# Patient Record
Sex: MALE | Race: WHITE | NOT HISPANIC OR LATINO | Employment: FULL TIME | URBAN - METROPOLITAN AREA
[De-identification: names, ages, dates, MRNs, and addresses within clinical notes are randomized per-mention and may not be internally consistent; named-entity substitution may affect disease eponyms.]

---

## 2018-06-29 PROBLEM — R53.83 OTHER FATIGUE: Status: ACTIVE | Noted: 2018-06-29

## 2018-06-29 PROBLEM — M54.16 LUMBAR RADICULOPATHY, CHRONIC: Status: ACTIVE | Noted: 2018-06-29

## 2018-06-29 PROBLEM — M48.061 SPINAL STENOSIS OF LUMBAR REGION: Status: ACTIVE | Noted: 2018-06-29

## 2018-06-29 PROBLEM — M51.37 DDD (DEGENERATIVE DISC DISEASE), LUMBOSACRAL: Status: ACTIVE | Noted: 2018-06-29

## 2018-06-29 PROBLEM — M51.34 DDD (DEGENERATIVE DISC DISEASE), THORACIC: Status: ACTIVE | Noted: 2018-06-29

## 2018-09-28 PROBLEM — E66.9 OBESITY (BMI 30-39.9): Status: ACTIVE | Noted: 2018-09-28

## 2019-04-22 PROBLEM — G62.9 NEUROPATHY: Status: ACTIVE | Noted: 2019-04-22

## 2019-04-22 PROBLEM — R03.0 ELEVATED BP WITHOUT DIAGNOSIS OF HYPERTENSION: Status: ACTIVE | Noted: 2019-04-22

## 2020-07-13 PROBLEM — R26.9 ALTERED GAIT: Status: ACTIVE | Noted: 2020-07-13

## 2020-07-13 PROBLEM — Z76.89 ESTABLISHING CARE WITH NEW DOCTOR, ENCOUNTER FOR: Status: ACTIVE | Noted: 2020-07-13

## 2020-07-13 PROBLEM — M62.838 MUSCLE SPASMS OF BOTH LOWER EXTREMITIES: Status: ACTIVE | Noted: 2020-07-13

## 2021-10-04 PROBLEM — G89.4 CHRONIC PAIN SYNDROME: Status: ACTIVE | Noted: 2021-10-04

## 2021-10-04 PROBLEM — Z00.00 WELL ADULT EXAM: Status: ACTIVE | Noted: 2020-07-13

## 2021-10-16 PROBLEM — U07.1 INFECTION DUE TO 2019-NCOV: Status: ACTIVE | Noted: 2021-10-16

## 2021-11-15 PROBLEM — Z86.16 HISTORY OF COVID-19: Status: ACTIVE | Noted: 2021-11-15

## 2021-11-15 PROBLEM — R07.89 CHEST TIGHTNESS: Status: ACTIVE | Noted: 2021-11-15

## 2021-11-15 PROBLEM — R42 DIZZINESS: Status: ACTIVE | Noted: 2021-11-15

## 2021-12-03 ENCOUNTER — OFFICE VISIT (OUTPATIENT)
Dept: URGENT CARE | Facility: CLINIC | Age: 56
End: 2021-12-03
Payer: COMMERCIAL

## 2021-12-03 VITALS
RESPIRATION RATE: 16 BRPM | HEART RATE: 82 BPM | OXYGEN SATURATION: 98 % | BODY MASS INDEX: 32.05 KG/M2 | DIASTOLIC BLOOD PRESSURE: 70 MMHG | WEIGHT: 236.6 LBS | TEMPERATURE: 98.1 F | SYSTOLIC BLOOD PRESSURE: 122 MMHG | HEIGHT: 72 IN

## 2021-12-03 DIAGNOSIS — U09.9 POST-COVID SYNDROME: ICD-10-CM

## 2021-12-03 DIAGNOSIS — R07.9 CHEST PAIN, UNSPECIFIED TYPE: ICD-10-CM

## 2021-12-03 PROCEDURE — 99204 OFFICE O/P NEW MOD 45 MIN: CPT | Performed by: FAMILY MEDICINE

## 2021-12-03 PROCEDURE — 93000 ELECTROCARDIOGRAM COMPLETE: CPT | Performed by: FAMILY MEDICINE

## 2021-12-03 ASSESSMENT — FIBROSIS 4 INDEX: FIB4 SCORE: 1.11

## 2021-12-04 NOTE — PROGRESS NOTES
Subjective:      Chief Complaint   Patient presents with   • Chest Pain     Had covid back in october and has been experiencing this pain since             History is as follows:    Pt was diagnosed with COVID 12 November.    S/p Regen-Cov treatment on 14 November.  He reports that all of his respiratory sx have resolved.             He has been feeling intermittent chest pain and pressure since he had COVID.     He continues to have chest pain at least 3 times per day.   Pain is no worse with exertion.    Denies n/v.    Pain is episodic in nature, typically lasts for 3-4 minutes per episode.   Reports his resting HR has been elevated since jody COVID       Pertinent negatives include no claudication, cough, exertional chest pressure, fever, nausea, near-syncope, numbness, syncope or vomiting.    Denies chest wall tenderness.          Past Medical History:   Diagnosis Date   • Pain     states has chronic back pain         Social History     Tobacco Use   • Smoking status: Never Smoker   • Smokeless tobacco: Never Used   Vaping Use   • Vaping Use: Never used   Substance Use Topics   • Alcohol use: Yes     Comment: occasionally   • Drug use: No     Comment: CBD oil          Current Outpatient Medications on File Prior to Visit   Medication Sig Dispense Refill   • gabapentin (NEURONTIN) 800 MG tablet Take 1 Tablet by mouth 3 times a day. 180 Tablet 1   • cyclobenzaprine (FLEXERIL) 10 mg Tab Take 1 Tablet by mouth 3 times a day as needed for Muscle Spasms. 90 Tablet 0   • albuterol 108 (90 Base) MCG/ACT Aero Soln inhalation aerosol Inhale 2 Puffs every four hours as needed for Shortness of Breath. (Patient not taking: Reported on 12/3/2021) 1 Each 2   • methylPREDNISolone (MEDROL DOSEPAK) 4 MG Tablet Therapy Pack As directed on the packaging label. 21 Tablet 0     No current facility-administered medications on file prior to visit.       Social History     Socioeconomic History   • Marital status:       Spouse name: Not on file   • Number of children: Not on file   • Years of education: Not on file   • Highest education level: Associate degree: occupational, technical, or vocational program   Occupational History   • Not on file   Tobacco Use   • Smoking status: Never Smoker   • Smokeless tobacco: Never Used   Vaping Use   • Vaping Use: Never used   Substance and Sexual Activity   • Alcohol use: Yes     Comment: occasionally   • Drug use: No     Comment: CBD oil    • Sexual activity: Yes     Partners: Female   Other Topics Concern   • Not on file   Social History Narrative   • Not on file     Social Determinants of Health     Financial Resource Strain: Low Risk    • Difficulty of Paying Living Expenses: Not very hard   Food Insecurity: No Food Insecurity   • Worried About Running Out of Food in the Last Year: Never true   • Ran Out of Food in the Last Year: Never true   Transportation Needs: No Transportation Needs   • Lack of Transportation (Medical): No   • Lack of Transportation (Non-Medical): No   Physical Activity: Insufficiently Active   • Days of Exercise per Week: 2 days   • Minutes of Exercise per Session: 20 min   Stress: Stress Concern Present   • Feeling of Stress : To some extent   Social Connections: Moderately Integrated   • Frequency of Communication with Friends and Family: More than three times a week   • Frequency of Social Gatherings with Friends and Family: Once a week   • Attends Baptism Services: 1 to 4 times per year   • Active Member of Clubs or Organizations: No   • Attends Club or Organization Meetings: Never   • Marital Status:    Intimate Partner Violence:    • Fear of Current or Ex-Partner: Not on file   • Emotionally Abused: Not on file   • Physically Abused: Not on file   • Sexually Abused: Not on file   Housing Stability: Low Risk    • Unable to Pay for Housing in the Last Year: No   • Number of Places Lived in the Last Year: 1   • Unstable Housing in the Last Year: No           Review of Systems   Constitutional: Negative for fever, chills and malaise/fatigue.   Eyes: Negative for vision changes, d/c.    Respiratory: Negative for cough and sputum production.    Cardiovascular: + for chest pain and palpitations.   Gastrointestinal: Negative for nausea, vomiting, abdominal pain, diarrhea and constipation.   Genitourinary: Negative for dysuria, urgency and frequency.   Skin: Negative for rash or  itching.   Neurological: Negative for dizziness and tingling.   Psychiatric/Behavioral: Negative for depression.   Hematologic/lymphatic - denies bruising or excessive bleeding  All other systems reviewed and are negative.         Objective:     /70 (BP Location: Right arm, Patient Position: Sitting, BP Cuff Size: Adult long)   Pulse 82   Temp 36.7 °C (98.1 °F) (Temporal)   Resp 16   Ht 1.829 m (6')   Wt 107 kg (236 lb 9.6 oz)   SpO2 98%       Physical Exam   Constitutional: pt is oriented to person, place, and time. Pt appears well-developed and well-nourished.   HENT:   Head: Normocephalic and atraumatic.   Mouth/Throat: Oropharynx is clear and moist.   Eyes: Conjunctivae and EOM are normal. Pupils are equal, round, and reactive to light. No scleral icterus.   Neck: Normal range of motion. Neck supple. No JVD present. No thyromegaly present.   Cardiovascular: Normal rate, regular rhythm, normal heart sounds and intact distal pulses.  Exam reveals no gallop and no friction rub.    No murmur heard.  Pulmonary/Chest: Effort normal and breath sounds normal. No respiratory distress. Pt has no wheezes. Pt has no rales. Pt exhibits no tenderness.   Abdominal: Soft.   Musculoskeletal: Normal range of motion. Pt exhibits no edema.   Lymphadenopathy:     Pt has no cervical adenopathy.   Neurological: pt is alert and oriented to person, place, and time. No cranial nerve deficit.   Skin: Skin is warm and dry. No erythema.   Psychiatric: pt has a normal mood and affect. patient's behavior is  normal.        EKG interpretation - normal sinus rhythm. No ST or QRS morphology changes. QT interval is normal. Axis is positive. No signs of ischemia.    Assessment/Plan:         1. Chest pain, unspecified type    Pt with intermittent chest pain since COVID diagnosis.  EKG was sinus rhythm with no abnormalities.  ACS felt unlikely at this time.      Pulmonary embolus also on the differential, although EKG, oxygen saturation were both normal.  Despite ACS being remote possibility, I still recommend urgent evaluation in ED to rule out - pt refused and understands potential risks.         Will refer to cardiology for risk stratification.        Labs ordered:     - REFERRAL TO CARDIOLOGY  - D-DIMER; Future  - CBC WITH DIFFERENTIAL; Future  - Comp Metabolic Panel; Future  - TSH           - REFERRAL TO CARDIOLOGY

## 2021-12-05 ENCOUNTER — TELEPHONE (OUTPATIENT)
Dept: URGENT CARE | Facility: PHYSICIAN GROUP | Age: 56
End: 2021-12-05

## 2021-12-05 NOTE — TELEPHONE ENCOUNTER
Please call pt:      Was seen Friday at [facility] for chest pain.       referred to cardiology - will be contacted on Monday.     Also, please remind pt that labs were ordered and he can go to any [facility] lab site to get them done.

## 2021-12-06 ENCOUNTER — HOSPITAL ENCOUNTER (OUTPATIENT)
Dept: LAB | Facility: MEDICAL CENTER | Age: 56
End: 2021-12-06
Attending: FAMILY MEDICINE
Payer: COMMERCIAL

## 2021-12-06 DIAGNOSIS — R07.9 CHEST PAIN, UNSPECIFIED TYPE: ICD-10-CM

## 2021-12-06 LAB
ALBUMIN SERPL BCP-MCNC: 4.2 G/DL (ref 3.2–4.9)
ALBUMIN/GLOB SERPL: 1.6 G/DL
ALP SERPL-CCNC: 82 U/L (ref 30–99)
ALT SERPL-CCNC: 11 U/L (ref 2–50)
ANION GAP SERPL CALC-SCNC: 10 MMOL/L (ref 7–16)
AST SERPL-CCNC: 13 U/L (ref 12–45)
BASOPHILS # BLD AUTO: 0.6 % (ref 0–1.8)
BASOPHILS # BLD: 0.03 K/UL (ref 0–0.12)
BILIRUB SERPL-MCNC: 0.4 MG/DL (ref 0.1–1.5)
BUN SERPL-MCNC: 18 MG/DL (ref 8–22)
CALCIUM SERPL-MCNC: 8.9 MG/DL (ref 8.4–10.2)
CHLORIDE SERPL-SCNC: 109 MMOL/L (ref 96–112)
CO2 SERPL-SCNC: 23 MMOL/L (ref 20–33)
CREAT SERPL-MCNC: 1.14 MG/DL (ref 0.5–1.4)
D DIMER PPP IA.FEU-MCNC: 3.44 UG/ML (FEU) (ref 0–0.5)
EOSINOPHIL # BLD AUTO: 0.08 K/UL (ref 0–0.51)
EOSINOPHIL NFR BLD: 1.6 % (ref 0–6.9)
ERYTHROCYTE [DISTWIDTH] IN BLOOD BY AUTOMATED COUNT: 43.1 FL (ref 35.9–50)
GLOBULIN SER CALC-MCNC: 2.7 G/DL (ref 1.9–3.5)
GLUCOSE SERPL-MCNC: 105 MG/DL (ref 65–99)
HCT VFR BLD AUTO: 40.4 % (ref 42–52)
HGB BLD-MCNC: 13.3 G/DL (ref 14–18)
IMM GRANULOCYTES # BLD AUTO: 0.02 K/UL (ref 0–0.11)
IMM GRANULOCYTES NFR BLD AUTO: 0.4 % (ref 0–0.9)
LYMPHOCYTES # BLD AUTO: 1.4 K/UL (ref 1–4.8)
LYMPHOCYTES NFR BLD: 28.2 % (ref 22–41)
MCH RBC QN AUTO: 30.5 PG (ref 27–33)
MCHC RBC AUTO-ENTMCNC: 32.9 G/DL (ref 33.7–35.3)
MCV RBC AUTO: 92.7 FL (ref 81.4–97.8)
MONOCYTES # BLD AUTO: 0.35 K/UL (ref 0–0.85)
MONOCYTES NFR BLD AUTO: 7 % (ref 0–13.4)
NEUTROPHILS # BLD AUTO: 3.09 K/UL (ref 1.82–7.42)
NEUTROPHILS NFR BLD: 62.2 % (ref 44–72)
NRBC # BLD AUTO: 0 K/UL
NRBC BLD-RTO: 0 /100 WBC
PLATELET # BLD AUTO: 196 K/UL (ref 164–446)
PMV BLD AUTO: 10 FL (ref 9–12.9)
POTASSIUM SERPL-SCNC: 4.5 MMOL/L (ref 3.6–5.5)
PROT SERPL-MCNC: 6.9 G/DL (ref 6–8.2)
RBC # BLD AUTO: 4.36 M/UL (ref 4.7–6.1)
SODIUM SERPL-SCNC: 142 MMOL/L (ref 135–145)
TSH SERPL DL<=0.005 MIU/L-ACNC: 0.85 UIU/ML (ref 0.38–5.33)
WBC # BLD AUTO: 5 K/UL (ref 4.8–10.8)

## 2021-12-06 PROCEDURE — 80053 COMPREHEN METABOLIC PANEL: CPT

## 2021-12-06 PROCEDURE — 84443 ASSAY THYROID STIM HORMONE: CPT

## 2021-12-06 PROCEDURE — 36415 COLL VENOUS BLD VENIPUNCTURE: CPT

## 2021-12-06 PROCEDURE — 85379 FIBRIN DEGRADATION QUANT: CPT

## 2021-12-06 PROCEDURE — 85025 COMPLETE CBC W/AUTO DIFF WBC: CPT

## 2021-12-07 ENCOUNTER — TELEPHONE (OUTPATIENT)
Dept: URGENT CARE | Facility: CLINIC | Age: 56
End: 2021-12-07

## 2021-12-07 NOTE — TELEPHONE ENCOUNTER
Pt called and would like return call from provider to go over lab results. Number in chart was verified as a good contact number.

## 2021-12-08 ENCOUNTER — HOSPITAL ENCOUNTER (OUTPATIENT)
Facility: MEDICAL CENTER | Age: 56
End: 2021-12-09
Attending: EMERGENCY MEDICINE | Admitting: FAMILY MEDICINE
Payer: COMMERCIAL

## 2021-12-08 ENCOUNTER — APPOINTMENT (OUTPATIENT)
Dept: CARDIOLOGY | Facility: PHYSICIAN GROUP | Age: 56
End: 2021-12-08
Payer: COMMERCIAL

## 2021-12-08 ENCOUNTER — APPOINTMENT (OUTPATIENT)
Dept: RADIOLOGY | Facility: MEDICAL CENTER | Age: 56
End: 2021-12-08
Attending: EMERGENCY MEDICINE
Payer: COMMERCIAL

## 2021-12-08 ENCOUNTER — HOSPITAL ENCOUNTER (OUTPATIENT)
Dept: RADIOLOGY | Facility: MEDICAL CENTER | Age: 56
End: 2021-12-08
Attending: FAMILY MEDICINE
Payer: COMMERCIAL

## 2021-12-08 ENCOUNTER — TELEPHONE (OUTPATIENT)
Dept: URGENT CARE | Facility: PHYSICIAN GROUP | Age: 56
End: 2021-12-08

## 2021-12-08 DIAGNOSIS — R79.89 ELEVATED D-DIMER: ICD-10-CM

## 2021-12-08 DIAGNOSIS — I26.99 ACUTE PULMONARY EMBOLISM, UNSPECIFIED PULMONARY EMBOLISM TYPE, UNSPECIFIED WHETHER ACUTE COR PULMONALE PRESENT (HCC): ICD-10-CM

## 2021-12-08 PROBLEM — R07.9 PAIN IN THE CHEST: Status: ACTIVE | Noted: 2021-12-08

## 2021-12-08 LAB
ALBUMIN SERPL BCP-MCNC: 4.4 G/DL (ref 3.2–4.9)
ALBUMIN/GLOB SERPL: 1.6 G/DL
ALP SERPL-CCNC: 83 U/L (ref 30–99)
ALT SERPL-CCNC: 11 U/L (ref 2–50)
ANION GAP SERPL CALC-SCNC: 9 MMOL/L (ref 7–16)
AST SERPL-CCNC: 13 U/L (ref 12–45)
BASOPHILS # BLD AUTO: 0.6 % (ref 0–1.8)
BASOPHILS # BLD: 0.03 K/UL (ref 0–0.12)
BILIRUB SERPL-MCNC: 0.3 MG/DL (ref 0.1–1.5)
BUN SERPL-MCNC: 16 MG/DL (ref 8–22)
CALCIUM SERPL-MCNC: 9.2 MG/DL (ref 8.5–10.5)
CHLORIDE SERPL-SCNC: 105 MMOL/L (ref 96–112)
CHOLEST SERPL-MCNC: 245 MG/DL (ref 100–199)
CO2 SERPL-SCNC: 25 MMOL/L (ref 20–33)
CREAT SERPL-MCNC: 0.97 MG/DL (ref 0.5–1.4)
EKG IMPRESSION: NORMAL
EOSINOPHIL # BLD AUTO: 0.13 K/UL (ref 0–0.51)
EOSINOPHIL NFR BLD: 2.5 % (ref 0–6.9)
ERYTHROCYTE [DISTWIDTH] IN BLOOD BY AUTOMATED COUNT: 43.2 FL (ref 35.9–50)
EST. AVERAGE GLUCOSE BLD GHB EST-MCNC: 111 MG/DL
GLOBULIN SER CALC-MCNC: 2.8 G/DL (ref 1.9–3.5)
GLUCOSE SERPL-MCNC: 90 MG/DL (ref 65–99)
HBA1C MFR BLD: 5.5 % (ref 4–5.6)
HCT VFR BLD AUTO: 40.7 % (ref 42–52)
HDLC SERPL-MCNC: 46 MG/DL
HGB BLD-MCNC: 14.1 G/DL (ref 14–18)
IMM GRANULOCYTES # BLD AUTO: 0.02 K/UL (ref 0–0.11)
IMM GRANULOCYTES NFR BLD AUTO: 0.4 % (ref 0–0.9)
LDLC SERPL CALC-MCNC: 182 MG/DL
LYMPHOCYTES # BLD AUTO: 1.64 K/UL (ref 1–4.8)
LYMPHOCYTES NFR BLD: 31.1 % (ref 22–41)
MCH RBC QN AUTO: 31.6 PG (ref 27–33)
MCHC RBC AUTO-ENTMCNC: 34.6 G/DL (ref 33.7–35.3)
MCV RBC AUTO: 91.3 FL (ref 81.4–97.8)
MONOCYTES # BLD AUTO: 0.31 K/UL (ref 0–0.85)
MONOCYTES NFR BLD AUTO: 5.9 % (ref 0–13.4)
NEUTROPHILS # BLD AUTO: 3.14 K/UL (ref 1.82–7.42)
NEUTROPHILS NFR BLD: 59.5 % (ref 44–72)
NRBC # BLD AUTO: 0 K/UL
NRBC BLD-RTO: 0 /100 WBC
PLATELET # BLD AUTO: 210 K/UL (ref 164–446)
PMV BLD AUTO: 10.2 FL (ref 9–12.9)
POTASSIUM SERPL-SCNC: 4.1 MMOL/L (ref 3.6–5.5)
PROT SERPL-MCNC: 7.2 G/DL (ref 6–8.2)
RBC # BLD AUTO: 4.46 M/UL (ref 4.7–6.1)
SODIUM SERPL-SCNC: 139 MMOL/L (ref 135–145)
TRIGL SERPL-MCNC: 87 MG/DL (ref 0–149)
TROPONIN T SERPL-MCNC: 10 NG/L (ref 6–19)
TROPONIN T SERPL-MCNC: <6 NG/L (ref 6–19)
WBC # BLD AUTO: 5.3 K/UL (ref 4.8–10.8)

## 2021-12-08 PROCEDURE — 80061 LIPID PANEL: CPT

## 2021-12-08 PROCEDURE — G0378 HOSPITAL OBSERVATION PER HR: HCPCS

## 2021-12-08 PROCEDURE — 71275 CT ANGIOGRAPHY CHEST: CPT

## 2021-12-08 PROCEDURE — 84484 ASSAY OF TROPONIN QUANT: CPT

## 2021-12-08 PROCEDURE — 85025 COMPLETE CBC W/AUTO DIFF WBC: CPT

## 2021-12-08 PROCEDURE — 93005 ELECTROCARDIOGRAM TRACING: CPT

## 2021-12-08 PROCEDURE — 80053 COMPREHEN METABOLIC PANEL: CPT

## 2021-12-08 PROCEDURE — 93005 ELECTROCARDIOGRAM TRACING: CPT | Performed by: EMERGENCY MEDICINE

## 2021-12-08 PROCEDURE — 83036 HEMOGLOBIN GLYCOSYLATED A1C: CPT

## 2021-12-08 PROCEDURE — 700117 HCHG RX CONTRAST REV CODE 255: Performed by: FAMILY MEDICINE

## 2021-12-08 PROCEDURE — 99285 EMERGENCY DEPT VISIT HI MDM: CPT

## 2021-12-08 PROCEDURE — 71045 X-RAY EXAM CHEST 1 VIEW: CPT

## 2021-12-08 PROCEDURE — 99218 PR INITIAL OBSERVATION CARE,LEVL I: CPT | Mod: GC | Performed by: FAMILY MEDICINE

## 2021-12-08 RX ORDER — CLONIDINE HYDROCHLORIDE 0.1 MG/1
0.1 TABLET ORAL EVERY 6 HOURS PRN
Status: DISCONTINUED | OUTPATIENT
Start: 2021-12-08 | End: 2021-12-09 | Stop reason: HOSPADM

## 2021-12-08 RX ORDER — AMOXICILLIN 250 MG
2 CAPSULE ORAL 2 TIMES DAILY
Status: DISCONTINUED | OUTPATIENT
Start: 2021-12-09 | End: 2021-12-09 | Stop reason: HOSPADM

## 2021-12-08 RX ORDER — ACETAMINOPHEN 325 MG/1
650 TABLET ORAL EVERY 6 HOURS PRN
Status: DISCONTINUED | OUTPATIENT
Start: 2021-12-08 | End: 2021-12-09 | Stop reason: HOSPADM

## 2021-12-08 RX ORDER — LABETALOL HYDROCHLORIDE 5 MG/ML
10 INJECTION, SOLUTION INTRAVENOUS EVERY 4 HOURS PRN
Status: DISCONTINUED | OUTPATIENT
Start: 2021-12-08 | End: 2021-12-09 | Stop reason: HOSPADM

## 2021-12-08 RX ORDER — ENALAPRILAT 1.25 MG/ML
1.25 INJECTION INTRAVENOUS EVERY 6 HOURS PRN
Status: DISCONTINUED | OUTPATIENT
Start: 2021-12-08 | End: 2021-12-09 | Stop reason: HOSPADM

## 2021-12-08 RX ORDER — BISACODYL 10 MG
10 SUPPOSITORY, RECTAL RECTAL
Status: DISCONTINUED | OUTPATIENT
Start: 2021-12-08 | End: 2021-12-09 | Stop reason: HOSPADM

## 2021-12-08 RX ORDER — GABAPENTIN 400 MG/1
800 CAPSULE ORAL 3 TIMES DAILY
Status: DISCONTINUED | OUTPATIENT
Start: 2021-12-08 | End: 2021-12-09 | Stop reason: HOSPADM

## 2021-12-08 RX ORDER — POLYETHYLENE GLYCOL 3350 17 G/17G
1 POWDER, FOR SOLUTION ORAL
Status: DISCONTINUED | OUTPATIENT
Start: 2021-12-08 | End: 2021-12-09 | Stop reason: HOSPADM

## 2021-12-08 RX ADMIN — IOHEXOL 100 ML: 350 INJECTION, SOLUTION INTRAVENOUS at 12:00

## 2021-12-08 ASSESSMENT — PATIENT HEALTH QUESTIONNAIRE - PHQ9
SUM OF ALL RESPONSES TO PHQ9 QUESTIONS 1 AND 2: 0
1. LITTLE INTEREST OR PLEASURE IN DOING THINGS: NOT AT ALL
2. FEELING DOWN, DEPRESSED, IRRITABLE, OR HOPELESS: NOT AT ALL

## 2021-12-08 ASSESSMENT — PAIN SCALES - WONG BAKER: WONGBAKER_NUMERICALRESPONSE: DOESN'T HURT AT ALL

## 2021-12-08 ASSESSMENT — FIBROSIS 4 INDEX
FIB4 SCORE: 1.119899279860264884
FIB4 SCORE: 1.05

## 2021-12-08 ASSESSMENT — PAIN DESCRIPTION - PAIN TYPE: TYPE: ACUTE PAIN

## 2021-12-08 NOTE — ED TRIAGE NOTES
Chief Complaint   Patient presents with   • Shortness of Breath     CTA positive for Right middle and lower lobe PE's. Symptoms started 4 weeks ago and patient was seen at  last friday and CT was ordered. Blood work and CT done today.   • Chest Pain     Pt ambulatory to triage for above complaint.  Pt is AO x 4, follows commands, and responds appropriately to questions. Patient's breathing is unlabored and pain is currently 8/10 on the 0-10 pain scale.  Pt placed in EKG room. Patient educated on triage process and encouraged to alert staff for any changes.

## 2021-12-08 NOTE — TELEPHONE ENCOUNTER
CT scan shows bilat PE    Pt continues to have chest pain, sob      Plan:      Pt will require higher level of care    Will tx to [facility] ED    Pt refused ambulance transfer - advised of risks      Report called to transfer line

## 2021-12-08 NOTE — TELEPHONE ENCOUNTER
Positive d-dimer      Plan:    Will schedule CTA for today.       Advised to go to ED in meantime is chest pain worsens.       He also has appt with cardiology today

## 2021-12-08 NOTE — PROGRESS NOTES
Subjective:   Chief Complaint: No chief complaint on file.      Yang Sánchez is a 56 y.o. male who is is referred by STEPHANIE Levy for chest pain, lightheadedness, fatigue, prior Covid infection.    DATA REVIEWED by me:  ECG (my personal interpretation) December 3, 2021  Sinus, 65, normal    Echo    Most recent labs:       Lab Results   Component Value Date/Time    WBC 5.0 12/06/2021 11:01 AM    HEMOGLOBIN 13.3 (L) 12/06/2021 11:01 AM    HEMATOCRIT 40.4 (L) 12/06/2021 11:01 AM    MCV 92.7 12/06/2021 11:01 AM    TSH 0.525 11/01/2021 09:35 AM      Lab Results   Component Value Date/Time    SODIUM 142 12/06/2021 11:01 AM    POTASSIUM 4.5 12/06/2021 11:01 AM    CHLORIDE 109 12/06/2021 11:01 AM    CO2 23 12/06/2021 11:01 AM    GLUCOSE 105 (H) 12/06/2021 11:01 AM    BUN 18 12/06/2021 11:01 AM    CREATININE 1.14 12/06/2021 11:01 AM      Lab Results   Component Value Date/Time    ASTSGOT 13 12/06/2021 11:01 AM    ALTSGPT 11 12/06/2021 11:01 AM    ALBUMIN 4.2 12/06/2021 11:01 AM      Lab Results   Component Value Date/Time    CHOLSTRLTOT 211 (H) 11/01/2021 09:35 AM    CHOLSTRLTOT 199 07/20/2020 07:40 AM     (H) 07/20/2020 07:40 AM    HDL 41 11/01/2021 09:35 AM    HDL 49.0 07/20/2020 07:40 AM    TRIGLYCERIDE 91 11/01/2021 09:35 AM    TRIGLYCERIDE 54 07/20/2020 07:40 AM     No results for input(s): NTPROBNP, TROPONINT in the last 72 hours.      Past Medical History:   Diagnosis Date   • Pain     states has chronic back pain     Past Surgical History:   Procedure Laterality Date   • UMBILICAL HERNIA REPAIR N/A 9/6/2016    Procedure: UMBILICAL HERNIA REPAIR;  Surgeon: Alphonso Cantu M.D.;  Location: SURGERY [facility];  Service:    • HERNIA REPAIR     • OTHER ABDOMINAL SURGERY      umbilical hernia repair, appy, vasectomy    • VASECTOMY       No family history on file.  Social History     Socioeconomic History   • Marital status:      Spouse name: Not on file   • Number of children: Not on file   •  Years of education: Not on file   • Highest education level: Associate degree: occupational, technical, or vocational program   Occupational History   • Not on file   Tobacco Use   • Smoking status: Never Smoker   • Smokeless tobacco: Never Used   Vaping Use   • Vaping Use: Never used   Substance and Sexual Activity   • Alcohol use: Yes     Comment: occasionally   • Drug use: No     Comment: CBD oil    • Sexual activity: Yes     Partners: Female   Other Topics Concern   • Not on file   Social History Narrative   • Not on file     Social Determinants of Health     Financial Resource Strain: Low Risk    • Difficulty of Paying Living Expenses: Not very hard   Food Insecurity: No Food Insecurity   • Worried About Running Out of Food in the Last Year: Never true   • Ran Out of Food in the Last Year: Never true   Transportation Needs: No Transportation Needs   • Lack of Transportation (Medical): No   • Lack of Transportation (Non-Medical): No   Physical Activity: Insufficiently Active   • Days of Exercise per Week: 2 days   • Minutes of Exercise per Session: 20 min   Stress: Stress Concern Present   • Feeling of Stress : To some extent   Social Connections: Moderately Integrated   • Frequency of Communication with Friends and Family: More than three times a week   • Frequency of Social Gatherings with Friends and Family: Once a week   • Attends Sabianist Services: 1 to 4 times per year   • Active Member of Clubs or Organizations: No   • Attends Club or Organization Meetings: Never   • Marital Status:    Intimate Partner Violence:    • Fear of Current or Ex-Partner: Not on file   • Emotionally Abused: Not on file   • Physically Abused: Not on file   • Sexually Abused: Not on file   Housing Stability: Low Risk    • Unable to Pay for Housing in the Last Year: No   • Number of Places Lived in the Last Year: 1   • Unstable Housing in the Last Year: No     No Known Allergies    Current Outpatient Medications   Medication  Sig Dispense Refill   • methylPREDNISolone (MEDROL DOSEPAK) 4 MG Tablet Therapy Pack As directed on the packaging label. 21 Tablet 0   • gabapentin (NEURONTIN) 800 MG tablet Take 1 Tablet by mouth 3 times a day. 180 Tablet 1   • cyclobenzaprine (FLEXERIL) 10 mg Tab Take 1 Tablet by mouth 3 times a day as needed for Muscle Spasms. 90 Tablet 0     No current facility-administered medications for this visit.       ROS  All others systems reviewed and negative.     Objective:     There were no vitals taken for this visit. There is no height or weight on file to calculate BMI.    General: No acute distress. Well nourished.  HEENT: EOM grossly intact, no scleral icterus, no pharyngeal erythema.   Neck:  No JVD, no bruits, trachea midline  CVS: RRR. Normal S1, S2. No M/R/G. No LE edema.  2+ radial pulses, 2+ PT pulses  Resp: CTAB. No wheezing or crackles/rhonchi. Normal respiratory effort.  Abdomen: Soft, NT, no sumaya hepatomegaly.  MSK/Ext: No clubbing or cyanosis.  Skin: Warm and dry, no rashes.  Neurological: CN III-XII grossly intact. No focal deficits.   Psych: A&O x 3, appropriate affect, good judgement        Assessment:     No diagnosis found.    Medical Decision Making:  Today's Assessment / Status / Plan:               Written instructions given today:        No follow-ups on file.    It is my pleasure to participate in the care of Mr. Sánchez.  Please do not hesitate to contact me with questions or concerns.    Annie Lopez MD, [facility]  Cardiologist [facility]    Please note that this dictation was created using voice recognition software. I have made every reasonable attempt to correct obvious errors, but it is possible there are errors of grammar and possibly content that I did not discover before finalizing the note.

## 2021-12-09 ENCOUNTER — APPOINTMENT (OUTPATIENT)
Dept: CARDIOLOGY | Facility: MEDICAL CENTER | Age: 56
End: 2021-12-09
Attending: STUDENT IN AN ORGANIZED HEALTH CARE EDUCATION/TRAINING PROGRAM
Payer: COMMERCIAL

## 2021-12-09 VITALS
HEIGHT: 72 IN | BODY MASS INDEX: 32.13 KG/M2 | HEART RATE: 83 BPM | RESPIRATION RATE: 16 BRPM | WEIGHT: 237.22 LBS | OXYGEN SATURATION: 93 % | DIASTOLIC BLOOD PRESSURE: 75 MMHG | SYSTOLIC BLOOD PRESSURE: 113 MMHG | TEMPERATURE: 98.1 F

## 2021-12-09 PROBLEM — R07.9 PAIN IN THE CHEST: Status: RESOLVED | Noted: 2021-12-08 | Resolved: 2021-12-09

## 2021-12-09 LAB
LV EJECT FRACT  99904: 60
LV EJECT FRACT MOD 2C 99903: 47.47
LV EJECT FRACT MOD 4C 99902: 60.39
LV EJECT FRACT MOD BP 99901: 47.91

## 2021-12-09 PROCEDURE — 700102 HCHG RX REV CODE 250 W/ 637 OVERRIDE(OP): Performed by: STUDENT IN AN ORGANIZED HEALTH CARE EDUCATION/TRAINING PROGRAM

## 2021-12-09 PROCEDURE — 93306 TTE W/DOPPLER COMPLETE: CPT | Mod: 26 | Performed by: INTERNAL MEDICINE

## 2021-12-09 PROCEDURE — G0378 HOSPITAL OBSERVATION PER HR: HCPCS

## 2021-12-09 PROCEDURE — 93306 TTE W/DOPPLER COMPLETE: CPT

## 2021-12-09 PROCEDURE — A9270 NON-COVERED ITEM OR SERVICE: HCPCS | Performed by: STUDENT IN AN ORGANIZED HEALTH CARE EDUCATION/TRAINING PROGRAM

## 2021-12-09 PROCEDURE — 99217 PR OBSERVATION CARE DISCHARGE: CPT | Performed by: INTERNAL MEDICINE

## 2021-12-09 RX ADMIN — APIXABAN 10 MG: 5 TABLET, FILM COATED ORAL at 05:31

## 2021-12-09 RX ADMIN — APIXABAN 10 MG: 5 TABLET, FILM COATED ORAL at 14:25

## 2021-12-09 RX ADMIN — GABAPENTIN 800 MG: 400 CAPSULE ORAL at 06:29

## 2021-12-09 RX ADMIN — APIXABAN 10 MG: 5 TABLET, FILM COATED ORAL at 00:00

## 2021-12-09 ASSESSMENT — LIFESTYLE VARIABLES
TOTAL SCORE: 0
EVER HAD A DRINK FIRST THING IN THE MORNING TO STEADY YOUR NERVES TO GET RID OF A HANGOVER: NO
HAVE PEOPLE ANNOYED YOU BY CRITICIZING YOUR DRINKING: NO
HOW MANY TIMES IN THE PAST YEAR HAVE YOU HAD 5 OR MORE DRINKS IN A DAY: 0
CONSUMPTION TOTAL: NEGATIVE
EVER FELT BAD OR GUILTY ABOUT YOUR DRINKING: NO
HAVE YOU EVER FELT YOU SHOULD CUT DOWN ON YOUR DRINKING: NO
DOES PATIENT WANT TO STOP DRINKING: NO
ON A TYPICAL DAY WHEN YOU DRINK ALCOHOL HOW MANY DRINKS DO YOU HAVE: 0
AVERAGE NUMBER OF DAYS PER WEEK YOU HAVE A DRINK CONTAINING ALCOHOL: 0
ALCOHOL_USE: NO
TOTAL SCORE: 0
TOTAL SCORE: 0

## 2021-12-09 ASSESSMENT — PATIENT HEALTH QUESTIONNAIRE - PHQ9
1. LITTLE INTEREST OR PLEASURE IN DOING THINGS: NOT AT ALL
2. FEELING DOWN, DEPRESSED, IRRITABLE, OR HOPELESS: NOT AT ALL
SUM OF ALL RESPONSES TO PHQ9 QUESTIONS 1 AND 2: 0

## 2021-12-09 ASSESSMENT — PAIN DESCRIPTION - PAIN TYPE: TYPE: ACUTE PAIN

## 2021-12-09 ASSESSMENT — PAIN SCALES - WONG BAKER: WONGBAKER_NUMERICALRESPONSE: DOESN'T HURT AT ALL

## 2021-12-09 NOTE — H&P
"[facility] MEDICINE HISTORY AND PHYSICAL     PATIENT ID:  NAME:  Yang Sánchez  MRN:               [id number]  YOB: 1965    Date of Admission: 12/8/2021     Attending: Dr. Rodriguez    Resident: Dr. Mary Yee, PGY 3    Primary Care Physician:  Pcp: PERCY Das    CC:  Sent in for findings of PE on CTA chest    HPI: Yang Sánchez is a 56 y.o. male PMH lumbar spinal stenosis on gabapentin, COVID-19, symptom onset Oct 11 2021, positive test Oct 12 (1x pfizer vaccine about 1 week prior to onset of sxs) who presented with the above CC.  The patient states that he was quite ill from Covid for 2 weeks starting on the above date.  He and his wife report that he had \"all of the symptoms, except for the respiratory stuff.\"  About 4 weeks ago, the patient began having shortness of breath.  He also reported having intermittent chest tightness in the substernal area, and sometimes pain.  He saw his primary care physician for this, and was referred to cardiology.  They state that the next appointment was approximately 7 weeks out, and so when the pain became particularly intense 5 days ago, who presented to urgent care.    The urgent care physician ordered a CT PE, as well as labs. The CT PE was performed today, and showed right middle and upper lobe emboli, as well as elevated right ventricular to left ventricular ratio of 1.5.  The patient was called and advised to present to the emergency department.    The patient still reports shortness of breath, though his ER course shows that his oxygen saturation is generally in the mid 90s (one value of 89%); he is not tachycardic, and he is nontoxic, speaking easily in full sentences with normal WOB.  His blood pressure is 130s to 140s systolic.  His CBC and CMP are within normal limits essentially, and his troponin T is undetectable.  His EKG shows sinus rhythm with a QTC of 402, and no ST changes.  His D-dimer is 3.44, and his chest x-ray shows no acute " changes.    REVIEW OF SYSTEMS:   Ten systems reviewed and were negative except as noted in the HPI.                PAST MEDICAL HISTORY:  Past Medical History:   Diagnosis Date   • Pain     states has chronic back pain       PAST SURGICAL HISTORY:  Past Surgical History:   Procedure Laterality Date   • UMBILICAL HERNIA REPAIR N/A 9/6/2016    Procedure: UMBILICAL HERNIA REPAIR;  Surgeon: Alphonso Cantu M.D.;  Location: SURGERY [facility];  Service:    • HERNIA REPAIR     • OTHER ABDOMINAL SURGERY      umbilical hernia repair, appy, vasectomy    • VASECTOMY         FAMILY HISTORY:  No family history on file.    SOCIAL HISTORY:   Pt lives in [place], [place]  Smoking denies  Etoh use very rare  Drug use denies    DIET:   Orders Placed This Encounter   Procedures   • Diet Order Diet: Regular     Standing Status:   Standing     Number of Occurrences:   1     Order Specific Question:   Diet:     Answer:   Regular [1]       ALLERGIES:  No Known Allergies    OUTPATIENT MEDICATIONS:    Current Facility-Administered Medications:   •  [START ON 12/9/2021] senna-docusate (PERICOLACE or SENOKOT S) 8.6-50 MG per tablet 2 Tablet, 2 Tablet, Oral, BID **AND** polyethylene glycol/lytes (MIRALAX) PACKET 1 Packet, 1 Packet, Oral, QDAY PRN **AND** magnesium hydroxide (MILK OF MAGNESIA) suspension 30 mL, 30 mL, Oral, QDAY PRN **AND** bisacodyl (DULCOLAX) suppository 10 mg, 10 mg, Rectal, QDAY PRN, Mary Yee M.D.  •  acetaminophen (Tylenol) tablet 650 mg, 650 mg, Oral, Q6HRS PRN, Mary Yee M.D.  •  cloNIDine (CATAPRES) tablet 0.1 mg, 0.1 mg, Oral, Q6HRS PRN, Mary Yee M.D.  •  enalaprilat (Vasotec) injection 1.25 mg 1 mL, 1.25 mg, Intravenous, Q6HRS PRN, Mary Yee M.D.  •  labetalol (NORMODYNE/TRANDATE) injection 10 mg, 10 mg, Intravenous, Q4HRS PRN, Mary Yee M.D.  •  apixaban (ELIQUIS) tablet 10 mg, 10 mg, Oral, BID **FOLLOWED BY** [START ON 12/15/2021] apixaban  (ELIQUIS) tablet 5 mg, 5 mg, Oral, BID, Mary Yee M.D.  •  gabapentin (NEURONTIN) tablet 800 mg, 800 mg, Oral, TID, Mary Yee M.D.    Current Outpatient Medications:   •  gabapentin (NEURONTIN) 800 MG tablet, Take 1 Tablet by mouth 3 times a day., Disp: 180 Tablet, Rfl: 1    PHYSICAL EXAM:  Vitals:    21 1529 21 1830 21 1900 21   BP: 140/90 143/88 123/84 136/85   Pulse: 71 76 83 78   Resp: 20 20 20 18   Temp: 36.3 °C (97.3 °F)      TempSrc: Temporal      SpO2: 95% 93% 89% 92%   Weight:       Height:       , Temp (24hrs), Av.1 °C (96.9 °F), Min:35.8 °C (96.4 °F), Max:36.3 °C (97.3 °F)  , Pulse Oximetry: 92 %, O2 Delivery Device: None - Room Air    General: Pt resting in NAD, cooperative.Nontoxic, well appearing, alert, no distress.Well kempt.   Skin:  Pink, warm and dry.  No rashes  HEENT: NC/AT. PERRL. EOMI. MMM. No nasal discharge. Oropharynx nonerythematous without exudate/plaques  Neck:  Supple without lymphadenopathy or rigidity. No JVD. No hepatojugular reflux (though some cervical adenopathy limiting exam).    Lungs:  Symmetrical.  CTAB with no W/R/R.  Good air movement   Cardiovascular:  S1/S2 RRR without M/R/G.  Abdomen:  Abdomen is soft NT/ND. +BS. No masses noted.  Genitourinary:  Deferred.    Extremities:  Full range of motion. No gross deformities noted. 2+ pulses in all extremities. No C/C/E   Spine:  Straight without vertebral anomalies.  CNS:  Muscle tone is normal. Cranial nerves II-XII grossly intact. 2+ DTRs.           LAB TESTS:   Recent Labs     21  1101 21  1347   WBC 5.0 5.3   RBC 4.36* 4.46*   HEMOGLOBIN 13.3* 14.1   HEMATOCRIT 40.4* 40.7*   MCV 92.7 91.3   MCH 30.5 31.6   RDW 43.1 43.2   PLATELETCT 196 210   MPV 10.0 10.2   NEUTSPOLYS 62.20 59.50   LYMPHOCYTES 28.20 31.10   MONOCYTES 7.00 5.90   EOSINOPHILS 1.60 2.50   BASOPHILS 0.60 0.60         Recent Labs     21  1101 21  1347   SODIUM 142 139   POTASSIUM 4.5  4.1   CHLORIDE 109 105   CO2 23 25   BUN 18 16   CREATININE 1.14 0.97   CALCIUM 8.9 9.2   ALBUMIN 4.2 4.4       CULTURES:   Results     ** No results found for the last 168 hours. **          IMAGES:  DX-CHEST-PORTABLE (1 VIEW)   Final Result      1.  There is no acute cardiopulmonary process.      EC-ECHOCARDIOGRAM COMPLETE W/O CONT    (Results Pending)         CONSULTS:   None    ASSESSMENT/PLAN: 56 y.o. male with PMH lumbar spinal stenosis on gabapentin, COVID-19, symptom onset Oct 11 2021, positive test Oct 12 (1x pfizer vaccine about 1 week prior to onset of sxs) admitted for PE found on o/p CT, as well as intermittent CP.    #Pulmonary emboli  #Chest pain  #History of COVID-19  The patient has had approximately 4 weeks of shortness of breath, and intermittent chest tightness in the substernal area.  He presented to urgent care approximately 5 days ago for increasing chest discomfort, and a CT scan was ordered for low to moderate probability PE.  This was performed today, and with the positive results of pulmonary embolus, the patient was told to go to the ER.  He denies any history of prior DVT or PE.  He also denies any period of inactivity, surgery or other risk factors prior to this.  -Admit to telemetry observation for chest pain, despite undetectable troponins and normal EKG  -Lipid panel and A1c to evaluate for further risk factors  -Eliquis at DVT/PE dosing for 7 days, to be followed by 5 mg twice daily thereafter (this seems to be a provoked PE, so will likely not need lifelong anticoagulation; to consider other hypercoagulability work-up by primary care physician)  -Patient states he was scheduled for an echocardiogram today but I believe the results may be unreliable per his current status of PE; day team to reconsider    #Spinal stenosis  #Peripheral neuropathy  -Continue home gabapentin                Quality Measures  Quality-Core Measures

## 2021-12-09 NOTE — PROGRESS NOTES
4 Eyes Skin Assessment Completed.    Head WDL  Ears WDL  Nose WDL  Mouth WDL  Neck WDL  Breast/Chest WDL  Shoulder Blades WDL  Spine WDL  (R) Arm/Elbow/Hand WDL  (L) Arm/Elbow/Hand WDL  Abdomen WDL  Groin WDL  Scrotum/Coccyx/Buttocks WDL  (R) Leg WDL  (L) Leg WDL  (R) Heel/Foot/Toe WDL  (L) Heel/Foot/Toe WDL          Devices In Places Tele Box and Pulse Ox      Interventions In Place Pillows and Pressure Redistribution Mattress    Possible Skin Injury No    Pictures Uploaded Into Epic N/A  Wound Consult Placed N/A  RN Wound Prevention Protocol Ordered No

## 2021-12-09 NOTE — DISCHARGE INSTRUCTIONS
Apixaban oral tablets  What is this medicine?  APIXABAN (a PIX a ban) is an anticoagulant (blood thinner). It is used to lower the chance of stroke in people with a medical condition called atrial fibrillation. It is also used to treat or prevent blood clots in the lungs or in the veins.  This medicine may be used for other purposes; ask your health care provider or pharmacist if you have questions.  COMMON BRAND NAME(S): Eliquis  What should I tell my health care provider before I take this medicine?  They need to know if you have any of these conditions:  · antiphospholipid antibody syndrome  · bleeding disorders  · bleeding in the brain  · blood in your stools (black or tarry stools) or if you have blood in your vomit  · history of blood clots  · history of stomach bleeding  · kidney disease  · liver disease  · mechanical heart valve  · an unusual or allergic reaction to apixaban, other medicines, foods, dyes, or preservatives  · pregnant or trying to get pregnant  · breast-feeding  How should I use this medicine?  Take this medicine by mouth with a glass of water. Follow the directions on the prescription label. You can take it with or without food. If it upsets your stomach, take it with food. Take your medicine at regular intervals. Do not take it more often than directed. Do not stop taking except on your doctor's advice. Stopping this medicine may increase your risk of a blood clot. Be sure to refill your prescription before you run out of medicine.  Talk to your pediatrician regarding the use of this medicine in children. Special care may be needed.  Overdosage: If you think you have taken too much of this medicine contact a poison control center or emergency room at once.  NOTE: This medicine is only for you. Do not share this medicine with others.  What if I miss a dose?  If you miss a dose, take it as soon as you can. If it is almost time for your next dose, take only that dose. Do not take double or  extra doses.  What may interact with this medicine?  This medicine may interact with the following:  · aspirin and aspirin-like medicines  · certain medicines for fungal infections like ketoconazole and itraconazole  · certain medicines for seizures like carbamazepine and phenytoin  · certain medicines that treat or prevent blood clots like warfarin, enoxaparin, and dalteparin  · clarithromycin  · NSAIDs, medicines for pain and inflammation, like ibuprofen or naproxen  · rifampin  · ritonavir  · [place] wort  This list may not describe all possible interactions. Give your health care provider a list of all the medicines, herbs, non-prescription drugs, or dietary supplements you use. Also tell them if you smoke, drink alcohol, or use illegal drugs. Some items may interact with your medicine.  What should I watch for while using this medicine?  Visit your healthcare professional for regular checks on your progress. You may need blood work done while you are taking this medicine. Your condition will be monitored carefully while you are receiving this medicine. It is important not to miss any appointments.  Avoid sports and activities that might cause injury while you are using this medicine. Severe falls or injuries can cause unseen bleeding. Be careful when using sharp tools or knives. Consider using an electric razor. Take special care brushing or flossing your teeth. Report any injuries, bruising, or red spots on the skin to your healthcare professional.  If you are going to need surgery or other procedure, tell your healthcare professional that you are taking this medicine.  Wear a medical ID bracelet or chain. Carry a card that describes your disease and details of your medicine and dosage times.  What side effects may I notice from receiving this medicine?  Side effects that you should report to your doctor or health care professional as soon as possible:  · allergic reactions like skin rash, itching or hives,  swelling of the face, lips, or tongue  · signs and symptoms of bleeding such as bloody or black, tarry stools; red or dark-brown urine; spitting up blood or brown material that looks like coffee grounds; red spots on the skin; unusual bruising or bleeding from the eye, gums, or nose  · signs and symptoms of a blood clot such as chest pain; shortness of breath; pain, swelling, or warmth in the leg  · signs and symptoms of a stroke such as changes in vision; confusion; trouble speaking or understanding; severe headaches; sudden numbness or weakness of the face, arm or leg; trouble walking; dizziness; loss of coordination  This list may not describe all possible side effects. Call your doctor for medical advice about side effects. You may report side effects to FDA at 4-267-BOB-3592.  Where should I keep my medicine?  Keep out of the reach of children.  Store at room temperature between 20 and 25 degrees C (68 and 77 degrees F). Throw away any unused medicine after the expiration date.  NOTE: This sheet is a summary. It may not cover all possible information. If you have questions about this medicine, talk to your doctor, pharmacist, or health care provider.  © 2020 Elsevier/Gold Standard (2019-08-28 17:39:34)        Pulmonary Embolism    A pulmonary embolism (PE) is a sudden blockage or decrease of blood flow in one or both lungs. Most blockages come from a blood clot that forms in the vein of a lower leg, thigh, or arm (deep vein thrombosis, DVT) and travels to the lungs. A clot is blood that has thickened into a gel or solid. PE is a dangerous and life-threatening condition that needs to be treated right away.  What are the causes?  This condition is usually caused by a blood clot that forms in a vein and moves to the lungs. In rare cases, it may be caused by air, fat, part of a tumor, or other tissue that moves through the veins and into the lungs.  What increases the risk?  The following factors may make you more  likely to develop this condition:  · Experiencing a traumatic injury, such as breaking a hip or leg.  · Having:  ? A spinal cord injury.  ? Orthopedic surgery, especially hip or knee replacement.  ? Any major surgery.  ? A stroke.  ? DVT.  ? Blood clots or blood clotting disease.  ? Long-term (chronic) lung or heart disease.  ? Cancer treated with chemotherapy.  ? A central venous catheter.  · Taking medicines that contain estrogen. These include birth control pills and hormone replacement therapy.  · Being:  ? Pregnant.  ? In the period of time after your baby is delivered (postpartum).  ? Older than age 60.  ? Overweight.  ? A smoker, especially if you have other risks.  What are the signs or symptoms?  Symptoms of this condition usually start suddenly and include:  · Shortness of breath during activity or at rest.  · Coughing, coughing up blood, or coughing up blood-tinged mucus.  · Chest pain that is often worse with deep breaths.  · Rapid or irregular heartbeat.  · Feeling light-headed or dizzy.  · Fainting.  · Feeling anxious.  · Fever.  · Sweating.  · Pain and swelling in a leg. This is a symptom of DVT, which can lead to PE.  How is this diagnosed?  This condition may be diagnosed based on:  · Your medical history.  · A physical exam.  · Blood tests.  · CT pulmonary angiogram. This test checks blood flow in and around your lungs.  · Ventilation-perfusion scan, also called a lung VQ scan. This test measures air flow and blood flow to the lungs.  · An ultrasound of the legs.  How is this treated?  Treatment for this condition depends on many factors, such as the cause of your PE, your risk for bleeding or developing more clots, and other medical conditions you have. Treatment aims to remove, dissolve, or stop blood clots from forming or growing larger. Treatment may include:  · Medicines, such as:  ? Blood thinning medicines (anticoagulants) to stop clots from forming.  ? Medicines that dissolve clots  (thrombolytics).  · Procedures, such as:  ? Using a flexible tube to remove a blood clot (embolectomy) or to deliver medicine to destroy it (catheter-directed thrombolysis).  ? Inserting a filter into a large vein that carries blood to the heart (inferior vena cava). This filter (vena cava filter) catches blood clots before they reach the lungs.  ? Surgery to remove the clot (surgical embolectomy). This is rare.  You may need a combination of immediate, long-term (up to 3 months after diagnosis), and extended (more than 3 months after diagnosis) treatments. Your treatment may continue for several months (maintenance therapy). You and your health care provider will work together to choose the treatment program that is best for you.  Follow these instructions at home:  Medicines  · Take over-the-counter and prescription medicines only as told by your health care provider.  · If you are taking an anticoagulant medicine:  ? Take the medicine every day at the same time each day.  ? Understand what foods and drugs interact with your medicine.  ? Understand the side effects of this medicine, including excessive bruising or bleeding. Ask your health care provider or pharmacist about other side effects.  General instructions  · Wear a medical alert bracelet or carry a medical alert card that says you have had a PE and lists what medicines you take.  · Ask your health care provider when you may return to your normal activities. Avoid sitting or lying for a long time without moving.  · Maintain a healthy weight. Ask your health care provider what weight is healthy for you.  · Do not use any products that contain nicotine or tobacco, such as cigarettes, e-cigarettes, and chewing tobacco. If you need help quitting, ask your health care provider.  · Talk with your health care provider about any travel plans. It is important to make sure that you are still able to take your medicine while on trips.  · Keep all follow-up visits as  told by your health care provider. This is important.  Contact a health care provider if:  · You missed a dose of your blood thinner medicine.  Get help right away if:  · You have:  ? New or increased pain, swelling, warmth, or redness in an arm or leg.  ? Numbness or tingling in an arm or leg.  ? Shortness of breath during activity or at rest.  ? A fever.  ? Chest pain.  ? A rapid or irregular heartbeat.  ? A severe headache.  ? Vision changes.  ? A serious fall or accident, or you hit your head.  ? Stomach (abdominal) pain.  ? Blood in your vomit, stool, or urine.  ? A cut that will not stop bleeding.  · You cough up blood.  · You feel light-headed or dizzy.  · You cannot move your arms or legs.  · You are confused or have memory loss.  These symptoms may represent a serious problem that is an emergency. Do not wait to see if the symptoms will go away. Get medical help right away. Call your local emergency services (911 in the [place] Do not drive yourself to the hospital.  Summary  · A pulmonary embolism (PE) is a sudden blockage or decrease of blood flow in one or both lungs. PE is a dangerous and life-threatening condition that needs to be treated right away.  · Treatments for this condition usually include medicines to thin your blood (anticoagulants) or medicines to break apart blood clots (thrombolytics).  · If you are given blood thinners, it is important to take the medicine every day at the same time each day.  · Understand what foods and drugs interact with any medicines that you are taking.  · If you have signs of PE or DVT, call your local emergency services (911 in the [place]  This information is not intended to replace advice given to you by your health care provider. Make sure you discuss any questions you have with your health care provider.  Document Released: 12/15/2001 Document Revised: 09/25/2019 Document Reviewed: 09/25/2019  Elsevier Patient Education © 2020 [employer]vier [employer]  Discharge  Instructions    Discharged to home by car with relative. Discharged via wheelchair, hospital escort: Yes.  Special equipment needed: Not Applicable    Be sure to schedule a follow-up appointment with your primary care doctor or any specialists as instructed.     Discharge Plan:   Diet Plan: Discussed  Activity Level: Discussed  Confirmed Symptoms Management: Discussed  Medication Reconciliation Updated: Yes  Influenza Vaccine Indication: Patient Refuses    I understand that a diet low in cholesterol, fat, and sodium is recommended for good health. Unless I have been given specific instructions below for another diet, I accept this instruction as my diet prescription.   Other diet: heart healthy    Special Instructions: None    · Is patient discharged on Warfarin / Coumadin?   No     Depression / Suicide Risk    As you are discharged from this [facility] facility, it is important to learn how to keep safe from harming yourself.    Recognize the warning signs:  · Abrupt changes in personality, positive or negative- including increase in energy   · Giving away possessions  · Change in eating patterns- significant weight changes-  positive or negative  · Change in sleeping patterns- unable to sleep or sleeping all the time   · Unwillingness or inability to communicate  · Depression  · Unusual sadness, discouragement and loneliness  · Talk of wanting to die  · Neglect of personal appearance   · Rebelliousness- reckless behavior  · Withdrawal from people/activities they love  · Confusion- inability to concentrate     If you or a loved one observes any of these behaviors or has concerns about self-harm, here's what you can do:  · Talk about it- your feelings and reasons for harming yourself  · Remove any means that you might use to hurt yourself (examples: pills, rope, extension cords, firearm)  · Get professional help from the community (Mental Health, Substance Abuse, psychological counseling)  · Do not be alone:Call  your Safe Contact- someone whom you trust who will be there for you.  · Call your local CRISIS HOTLINE 066-1424 or 111-168-5619  · Call your local Children's Mobile Crisis Response Team [place] (911) 333-4252 or www.WhatClinic.com  · Call the toll free National Suicide Prevention Hotlines   · National Suicide Prevention Lifeline 210-027-VOPM (5034)  · National [place] [facility] Network 800-SUICIDE (943-4031)

## 2021-12-09 NOTE — CARE PLAN
The patient is stable    Clinical Goals: relief from sob, stable vitals  Patient Goals: breath better, discharge in am  Family Goals: n/a    Progress made toward(s) clinical / shift goals:  progressing    Problem: Pain - Standard  Goal: Alleviation of pain or a reduction in pain to the patient’s comfort goal  Outcome: Progressing     Problem: Knowledge Deficit - Standard  Goal: Patient and family/care givers will demonstrate understanding of plan of care, disease process/condition, diagnostic tests and medications  Outcome: Progressing     Problem: Respiratory  Goal: Patient will achieve/maintain optimum respiratory ventilation and gas exchange  Outcome: Progressing

## 2021-12-09 NOTE — DISCHARGE PLANNING
Received request for Apixaban co pay. Called [facility] in [place] # [phone] and Apixaban will need PA. PA contact # [phone]. Called [phone] transferred to [phone] and spoke with Lynn BRICE. Requested PA. All information given with request to expedite. Request submitted per Lynn BRICE and will receive update with in 2 hr. Case # [id number]. Will F/U in 2 hrs if no update. Frannie GARCÍA updated. Dr Lima updated.

## 2021-12-09 NOTE — PROGRESS NOTES
Tele shift  summary:  Rhythm: SR  HR range: 72-80  Ectopy: no ectopy  Measurements: 0.16/ 0.08/ 0.36

## 2021-12-09 NOTE — DISCHARGE PLANNING
Called [phone] to F/U on Apixaban PA and spoke with Miri and she escalated to Pharmacist. Inquired to how long it might be and she was not sure. Updated that CM will call back if have not heard in 1 hour. Dr. Lima updated.

## 2021-12-09 NOTE — PROGRESS NOTES
Pt to be discharged. Pt states he is ready to go. No c/o. Discussed DC with RICKY Kathleen. Discharge instructions given to patient at bedside, verbalizes understanding and states plans for follow-up. Telemetry monitor/IV cathlon removed. All belongings accounted for, all questions answered at this time. Pt declines offer of WC, he leaves floor, walking w/ family at side.

## 2021-12-09 NOTE — CARE PLAN
The patient is Stable - Low risk of patient condition declining or worsening    Shift Goals  Clinical Goals: monitor WOB,   Patient Goals: discharge  Family Goals: N/A    Progress made toward(s) clinical / shift goals:    Problem: Knowledge Deficit - Standard  Goal: Patient and family/care givers will demonstrate understanding of plan of care, disease process/condition, diagnostic tests and medications  Outcome: Progressing     Problem: Pain - Standard  Goal: Alleviation of pain or a reduction in pain to the patient’s comfort goal  Outcome: Progressing       Patient is not progressing towards the following goals:

## 2021-12-09 NOTE — DISCHARGE PLANNING
Received fax that Apixaban approved. Approval from 12/09/21- 01/09/2022. Called [facility] in [place] and co pay is $55.00. Spoke with patient and ok with co pay amount. Dr. Lima and Frannie GARCÍA updated.

## 2021-12-09 NOTE — ED NOTES
Pt roomed by ED tech.  Provided a gown to change into.  Placed up for ERP evaluation.  Mery GARCÍA bedside to place PIV.

## 2021-12-09 NOTE — DISCHARGE SUMMARY
Discharge Summary    CHIEF COMPLAINT ON ADMISSION  Chief Complaint   Patient presents with   • Shortness of Breath     CTA positive for Right middle and lower lobe PE's. Symptoms started 4 weeks ago and patient was seen at  last friday and CT was ordered. Blood work and CT done today.   • Chest Pain       Reason for Admission  Sent by urgent care     Admission Date  12/8/2021    CODE STATUS  Full Code    HPI & HOSPITAL COURSE  Yang Sánchez is a 56 y.o. male PMH lumbar spinal stenosis on gabapentin, COVID-19, symptom onset Oct 11 2021, positive test Oct 12 (1x pfizer vaccine about 1 week prior to onset of sxs) who presented with the above chest pain and shortness of breath.       CTA Chest showed right middle and upper lobe emboli, as well as elevated right ventricular to left ventricular ratio of 1.5. The patient was started on eliquis 10 mg BID for 7 days followed by 5 mg BID. 2D echo revealed The left ventricular ejection fraction is visually estimated to be 60%.The right ventricle is normal in size and systolic function. No significant valve abnormality. Normal estimated right atrial pressure, unable to estimate RVSP.     The patient was saturating well on room air and his vitals remained. He was discharged home and instructed to follow up with his PCP. He will likely need anticoagulation for at least 6 months. He was instructed to return to the ER for worsening chest pain or shortness of breath.    Therefore, he is discharged in good and stable condition to home with close outpatient follow-up.        Discharge Date  12/9/21    FOLLOW UP ITEMS POST DISCHARGE  Follow up with pcp    DISCHARGE DIAGNOSES  Principal Problem:    Pulmonary embolism, bilateral (HCC) POA: Yes  Resolved Problems:    Pain in the chest POA: Unknown      FOLLOW UP  Future Appointments   Date Time Provider Department Center   1/5/2022  3:30 PM Drew T. Blumberg, D.O. [facility] None     No follow-up provider specified.    MEDICATIONS ON DISCHARGE      Medication List      START taking these medications      Instructions   * apixaban 5mg Tabs  Commonly known as: ELIQUIS   Take 2 Tablets by mouth 2 times a day for 6 days. Indications: DVT/PE  Dose: 10 mg     * apixaban 5mg Tabs  Start taking on: December 15, 2021  Commonly known as: ELIQUIS   Take 1 Tablet by mouth 2 times a day. Indications: DVT/PE  Dose: 5 mg         * This list has 2 medication(s) that are the same as other medications prescribed for you. Read the directions carefully, and ask your doctor or other care provider to review them with you.            CONTINUE taking these medications      Instructions   gabapentin 800 MG tablet  Commonly known as: NEURONTIN   Take 1 Tablet by mouth 3 times a day.  Dose: 800 mg            Allergies  No Known Allergies    DIET  Orders Placed This Encounter   Procedures   • Diet Order Diet: Regular     Standing Status:   Standing     Number of Occurrences:   1     Order Specific Question:   Diet:     Answer:   Regular [1]       ACTIVITY  As tolerated.  Weight bearing as tolerated    CONSULTATIONS  None    PROCEDURES  None    LABORATORY  Lab Results   Component Value Date    SODIUM 139 12/08/2021    POTASSIUM 4.1 12/08/2021    CHLORIDE 105 12/08/2021    CO2 25 12/08/2021    GLUCOSE 90 12/08/2021    BUN 16 12/08/2021    CREATININE 0.97 12/08/2021        Lab Results   Component Value Date    WBC 5.3 12/08/2021    HEMOGLOBIN 14.1 12/08/2021    HEMATOCRIT 40.7 (L) 12/08/2021    PLATELETCT 210 12/08/2021        Total time of the discharge process exceeds 35 minutes.

## 2021-12-09 NOTE — ED PROVIDER NOTES
"ED Provider Note    Scribed for Dr. Deshawn Payne M.D. by Darryl Stein. 12/8/2021  6:31 PM    Primary care provider: Pcp Pt States None  Means of arrival: Walk in  History obtained from: Patient  History limited by: None    CHIEF COMPLAINT  Chief Complaint   Patient presents with   • Shortness of Breath     CTA positive for Right middle and lower lobe PE's. Symptoms started 4 weeks ago and patient was seen at  last friday and CT was ordered. Blood work and CT done today.   • Chest Pain       HPI  Yang Sánchez is a 56 y.o. male who presents to the Emergency Department for worsening waxing and waning chest pain onset 3-4 weeks ago. He describes having pain around his heart that \"twists\" and becomes extremely painful, as well as pain across his back that he is unable to differentiate from his spinal stenosis. Per the patient, his chest pain suddenly worsened 5 days ago, prompting him to go to urgent care for concerns of a heart attack. His provider subsequently ordered a CTA of his chest which he ended up having done today. Afterwards, he was advised to come to this ED for further evaluation of pulmonary embolisms noted in the right middle and lower lobes. He does not believe he has been significantly short of breath as of late, however his wife noticed he was when his pain first began and that he tends to take quick breaths in his sleep. The patient reports additional symptoms of right leg pain, and denies diaphoresis or left arm pain. Deep breaths and exertion do not exacerbate his pain; no other exacerbating or alleviating factors were noted. He also denies smoking and alcohol use, and adds he had COVID-19 in November. He takes gabapentin TID for spinal stenosis.     REVIEW OF SYSTEMS  Pertinent positives include chest pain, right leg pain, shortness of breath. Pertinent negatives include no diaphoresis, left arm pain. As above, all other systems reviewed and are negative.   See HPI for further details. "     PAST MEDICAL HISTORY   has a past medical history of Pain.    SURGICAL HISTORY   has a past surgical history that includes hernia repair; vasectomy; other abdominal surgery; and umbilical hernia repair (N/A, 9/6/2016).    SOCIAL HISTORY  Social History     Tobacco Use   • Smoking status: Never Smoker   • Smokeless tobacco: Never Used   Vaping Use   • Vaping Use: Never used   Substance Use Topics   • Alcohol use: Yes     Comment: occasionally   • Drug use: No     Comment: CBD oil       Social History     Substance and Sexual Activity   Drug Use No    Comment: CBD oil        FAMILY HISTORY  None pertinent    CURRENT MEDICATIONS  Home Medications     Reviewed by Raji Charlse (Pharmacy Tech) on 12/08/21 at 1912  Med List Status: Complete   Medication Last Dose Status   gabapentin (NEURONTIN) 800 MG tablet 12/8/2021 Active                ALLERGIES  No Known Allergies    PHYSICAL EXAM  VITAL SIGNS: /90   Pulse 71   Temp 36.3 °C (97.3 °F) (Temporal)   Resp 20   Ht 1.829 m (6')   Wt 107 kg (235 lb 14.3 oz)   SpO2 95%   BMI 31.99 kg/m²     Constitutional: Well developed, Well nourished, No acute distress, Non-toxic appearance.   HENT: Normocephalic, Atraumatic, Bilateral external ears normal, Oropharynx moist, No oral exudates.   Eyes: PERRLA, EOMI, Conjunctiva normal, No discharge.   Neck: No tenderness, Supple, No stridor.   Lymphatic: No lymphadenopathy noted.   Cardiovascular: Normal heart rate, Normal rhythm.   Thorax & Lungs: Clear to auscultation bilaterally, No respiratory distress, No wheezing, No crackles.   Abdomen: Soft, No tenderness, No masses, No pulsatile masses.   Skin: Warm, Dry, No erythema, No rash.   Extremities:, No edema No cyanosis.   Musculoskeletal: No tenderness to palpation or major deformities noted.  Intact distal pulses  Neurologic: Awake, alert. Moves all extremities spontaneously.  Psychiatric: Affect normal, Judgment normal, Mood normal.     LABS  Results for  orders placed or performed during the hospital encounter of 12/08/21   CBC with Differential   Result Value Ref Range    WBC 5.3 4.8 - 10.8 K/uL    RBC 4.46 (L) 4.70 - 6.10 M/uL    Hemoglobin 14.1 14.0 - 18.0 g/dL    Hematocrit 40.7 (L) 42.0 - 52.0 %    MCV 91.3 81.4 - 97.8 fL    MCH 31.6 27.0 - 33.0 pg    MCHC 34.6 33.7 - 35.3 g/dL    RDW 43.2 35.9 - 50.0 fL    Platelet Count 210 164 - 446 K/uL    MPV 10.2 9.0 - 12.9 fL    Neutrophils-Polys 59.50 44.00 - 72.00 %    Lymphocytes 31.10 22.00 - 41.00 %    Monocytes 5.90 0.00 - 13.40 %    Eosinophils 2.50 0.00 - 6.90 %    Basophils 0.60 0.00 - 1.80 %    Immature Granulocytes 0.40 0.00 - 0.90 %    Nucleated RBC 0.00 /100 WBC    Neutrophils (Absolute) 3.14 1.82 - 7.42 K/uL    Lymphs (Absolute) 1.64 1.00 - 4.80 K/uL    Monos (Absolute) 0.31 0.00 - 0.85 K/uL    Eos (Absolute) 0.13 0.00 - 0.51 K/uL    Baso (Absolute) 0.03 0.00 - 0.12 K/uL    Immature Granulocytes (abs) 0.02 0.00 - 0.11 K/uL    NRBC (Absolute) 0.00 K/uL   Complete Metabolic Panel (CMP)   Result Value Ref Range    Sodium 139 135 - 145 mmol/L    Potassium 4.1 3.6 - 5.5 mmol/L    Chloride 105 96 - 112 mmol/L    Co2 25 20 - 33 mmol/L    Anion Gap 9.0 7.0 - 16.0    Glucose 90 65 - 99 mg/dL    Bun 16 8 - 22 mg/dL    Creatinine 0.97 0.50 - 1.40 mg/dL    Calcium 9.2 8.5 - 10.5 mg/dL    AST(SGOT) 13 12 - 45 U/L    ALT(SGPT) 11 2 - 50 U/L    Alkaline Phosphatase 83 30 - 99 U/L    Total Bilirubin 0.3 0.1 - 1.5 mg/dL    Albumin 4.4 3.2 - 4.9 g/dL    Total Protein 7.2 6.0 - 8.2 g/dL    Globulin 2.8 1.9 - 3.5 g/dL    A-G Ratio 1.6 g/dL   Troponin   Result Value Ref Range    Troponin T <6 6 - 19 ng/L   ESTIMATED GFR   Result Value Ref Range    GFR If African American >60 >60 mL/min/1.73 m 2    GFR If Non African American >60 >60 mL/min/1.73 m 2   EKG   Result Value Ref Range    Report       [facility] Emergency Dept.    Test Date:  2021-12-08  Pt Name:    SANDEEP BATRES                  Department: ER  MRN:         [id number]                      Room:  Gender:     Male                         Technician: TCM  :        1965                   Requested By:ER TRIAGE PROTOCOL  Order #:    [id number]                    Reading MD:    Measurements  Intervals                                Axis  Rate:       83                           P:          71  VA:         166                          QRS:        27  QRSD:       79                           T:          22  QT:         342  QTc:        402    Interpretive Statements  Sinus rhythm  No previous ECG available for comparison       All labs reviewed by me.    EKG       RADIOLOGY  DX-CHEST-PORTABLE (1 VIEW)   Final Result      1.  There is no acute cardiopulmonary process.      EC-ECHOCARDIOGRAM COMPLETE W/O CONT    (Results Pending)     The radiologist's interpretation of all radiological studies have been reviewed by me.    COURSE & MEDICAL DECISION MAKING  Pertinent Labs & Imaging studies reviewed. (See chart for details)    6:31 PM - Patient seen and examined at bedside. Discussed labs and radiology resulted prior to being seen, and plans to evaluate further with an echo. Also discussed low likelihood for a heart attack being the cause of his symptoms, especially in light of his CTA results and normal EKG and normal troponin in the ED. Ordered DX-chest, EC-echocardiogram w/o, estimated GFR, CBC w/diff, CMP, troponin, and an EKG to evaluate his symptoms.     6:53 PM - Paged the hospitalist.    6:55 PM I discussed the patient's case and the above findings with Dr. Rodriguez ([facility]) who agreed to hospitalize the patient.     Decision Making:  Is a 56-year-old male who is presenting with some chest pain and a CT scan is positive for pulmonary embolus.  The patient will need to be admitted and started on anticoagulants.  Case discussed with the hospitalist will be admitting    DISPOSITION:  Patient will be hospitalized by Dr. Rodriguez in guarded  condition.    FINAL IMPRESSION  1. Acute pulmonary embolism, unspecified pulmonary embolism type, unspecified whether acute cor pulmonale present (HCC)          Darryl HICKS (Scribe), am scribing for, and in the presence of, Deshawn Payne M.D..    Electronically signed by: Darryl Stein (Scribe), 12/8/2021    Deshawn HICKS M.D. personally performed the services described in this documentation, as scribed by Darryl Stein in my presence, and it is both accurate and complete.    C    The note accurately reflects work and decisions made by me.  Deshawn Payne M.D.  12/8/2021  11:03 PM

## 2021-12-10 ENCOUNTER — APPOINTMENT (OUTPATIENT)
Dept: CARDIOLOGY | Facility: MEDICAL CENTER | Age: 56
End: 2021-12-10
Attending: FAMILY MEDICINE
Payer: COMMERCIAL

## 2021-12-13 ENCOUNTER — TELEPHONE (OUTPATIENT)
Dept: URGENT CARE | Facility: CLINIC | Age: 56
End: 2021-12-13

## 2021-12-13 NOTE — TELEPHONE ENCOUNTER
Pt call:   Recent: Bilateral pulmonaryism in lungs,  Hospitalized.      Pt asked how long after should he take a blood test after starting blood thinners, if necessary. Please return patient call or message on my chart.

## 2021-12-13 NOTE — TELEPHONE ENCOUNTER
"Returned pt phone call:   Re - anticoagulation      Currently on eliquis and denies any unsual bleeding or bruising.       Plan:  At least 6 mths anticoagulation, assuming this was \"provoked\" PE    Pt has f/u with new PCP in one week.       Pt had no further questions    "

## 2022-12-20 PROBLEM — K43.2 RECURRENT VENTRAL HERNIA: Status: ACTIVE | Noted: 2022-10-18

## 2023-02-03 PROBLEM — M48.062 NEUROGENIC CLAUDICATION DUE TO LUMBAR SPINAL STENOSIS: Status: ACTIVE | Noted: 2023-02-03

## 2023-04-19 ENCOUNTER — APPOINTMENT (OUTPATIENT)
Dept: ADMISSIONS | Facility: MEDICAL CENTER | Age: 58
End: 2023-04-19
Attending: NEUROLOGICAL SURGERY
Payer: COMMERCIAL

## 2023-05-01 ENCOUNTER — PRE-ADMISSION TESTING (OUTPATIENT)
Dept: ADMISSIONS | Facility: MEDICAL CENTER | Age: 58
End: 2023-05-01
Attending: NEUROLOGICAL SURGERY
Payer: COMMERCIAL

## 2023-05-01 RX ORDER — CYCLOBENZAPRINE HCL 10 MG
10 TABLET ORAL 3 TIMES DAILY PRN
Status: ON HOLD | COMMUNITY
End: 2023-06-08

## 2023-05-01 RX ORDER — TIZANIDINE 4 MG/1
4 TABLET ORAL EVERY 6 HOURS PRN
Status: ON HOLD | COMMUNITY
End: 2023-06-08

## 2023-05-22 ENCOUNTER — HOSPITAL ENCOUNTER (OUTPATIENT)
Dept: RADIOLOGY | Facility: MEDICAL CENTER | Age: 58
End: 2023-05-22
Attending: NEUROLOGICAL SURGERY
Payer: COMMERCIAL

## 2023-05-22 ENCOUNTER — PRE-ADMISSION TESTING (OUTPATIENT)
Dept: ADMISSIONS | Facility: MEDICAL CENTER | Age: 58
End: 2023-05-22
Attending: NEUROLOGICAL SURGERY
Payer: COMMERCIAL

## 2023-05-22 DIAGNOSIS — M48.062 SPINAL STENOSIS OF LUMBAR REGION WITH NEUROGENIC CLAUDICATION: ICD-10-CM

## 2023-05-22 LAB
25(OH)D3 SERPL-MCNC: 30 NG/ML (ref 30–100)
ABO GROUP BLD: NORMAL
ANION GAP SERPL CALC-SCNC: 9 MMOL/L (ref 7–16)
APPEARANCE UR: CLEAR
APTT PPP: 27.1 SEC (ref 24.7–36)
BASOPHILS # BLD AUTO: 0.8 % (ref 0–1.8)
BASOPHILS # BLD: 0.04 K/UL (ref 0–0.12)
BILIRUB UR QL STRIP.AUTO: NEGATIVE
BLD GP AB SCN SERPL QL: NORMAL
BUN SERPL-MCNC: 20 MG/DL (ref 8–22)
CALCIUM SERPL-MCNC: 8.9 MG/DL (ref 8.5–10.5)
CHLORIDE SERPL-SCNC: 109 MMOL/L (ref 96–112)
CO2 SERPL-SCNC: 23 MMOL/L (ref 20–33)
COLOR UR: YELLOW
CREAT SERPL-MCNC: 1.1 MG/DL (ref 0.5–1.4)
EKG IMPRESSION: NORMAL
EOSINOPHIL # BLD AUTO: 0.09 K/UL (ref 0–0.51)
EOSINOPHIL NFR BLD: 1.8 % (ref 0–6.9)
ERYTHROCYTE [DISTWIDTH] IN BLOOD BY AUTOMATED COUNT: 40 FL (ref 35.9–50)
EST. AVERAGE GLUCOSE BLD GHB EST-MCNC: 105 MG/DL
GFR SERPLBLD CREATININE-BSD FMLA CKD-EPI: 78 ML/MIN/1.73 M 2
GLUCOSE SERPL-MCNC: 104 MG/DL (ref 65–99)
GLUCOSE UR STRIP.AUTO-MCNC: NEGATIVE MG/DL
HBA1C MFR BLD: 5.3 % (ref 4–5.6)
HCT VFR BLD AUTO: 44.4 % (ref 42–52)
HGB BLD-MCNC: 15.1 G/DL (ref 14–18)
IMM GRANULOCYTES # BLD AUTO: 0.01 K/UL (ref 0–0.11)
IMM GRANULOCYTES NFR BLD AUTO: 0.2 % (ref 0–0.9)
INR PPP: 1 (ref 0.87–1.13)
KETONES UR STRIP.AUTO-MCNC: NEGATIVE MG/DL
LEUKOCYTE ESTERASE UR QL STRIP.AUTO: NEGATIVE
LYMPHOCYTES # BLD AUTO: 1.57 K/UL (ref 1–4.8)
LYMPHOCYTES NFR BLD: 32.2 % (ref 22–41)
MCH RBC QN AUTO: 31.1 PG (ref 27–33)
MCHC RBC AUTO-ENTMCNC: 34 G/DL (ref 32.3–36.5)
MCV RBC AUTO: 91.4 FL (ref 81.4–97.8)
MICRO URNS: NORMAL
MONOCYTES # BLD AUTO: 0.34 K/UL (ref 0–0.85)
MONOCYTES NFR BLD AUTO: 7 % (ref 0–13.4)
NEUTROPHILS # BLD AUTO: 2.82 K/UL (ref 1.82–7.42)
NEUTROPHILS NFR BLD: 58 % (ref 44–72)
NITRITE UR QL STRIP.AUTO: NEGATIVE
NRBC # BLD AUTO: 0 K/UL
NRBC BLD-RTO: 0 /100 WBC (ref 0–0.2)
PH UR STRIP.AUTO: 5.5 [PH] (ref 5–8)
PLATELET # BLD AUTO: 206 K/UL (ref 164–446)
PMV BLD AUTO: 10.9 FL (ref 9–12.9)
POTASSIUM SERPL-SCNC: 4.4 MMOL/L (ref 3.6–5.5)
PROT UR QL STRIP: NEGATIVE MG/DL
PROTHROMBIN TIME: 13.1 SEC (ref 12–14.6)
RBC # BLD AUTO: 4.86 M/UL (ref 4.7–6.1)
RBC UR QL AUTO: NEGATIVE
RH BLD: NORMAL
SODIUM SERPL-SCNC: 141 MMOL/L (ref 135–145)
SP GR UR STRIP.AUTO: 1.01
UROBILINOGEN UR STRIP.AUTO-MCNC: 0.2 MG/DL
WBC # BLD AUTO: 4.9 K/UL (ref 4.8–10.8)

## 2023-05-22 PROCEDURE — 82306 VITAMIN D 25 HYDROXY: CPT

## 2023-05-22 PROCEDURE — 85730 THROMBOPLASTIN TIME PARTIAL: CPT

## 2023-05-22 PROCEDURE — 83036 HEMOGLOBIN GLYCOSYLATED A1C: CPT

## 2023-05-22 PROCEDURE — 86850 RBC ANTIBODY SCREEN: CPT

## 2023-05-22 PROCEDURE — 36415 COLL VENOUS BLD VENIPUNCTURE: CPT

## 2023-05-22 PROCEDURE — 93005 ELECTROCARDIOGRAM TRACING: CPT

## 2023-05-22 PROCEDURE — 80048 BASIC METABOLIC PNL TOTAL CA: CPT

## 2023-05-22 PROCEDURE — 71046 X-RAY EXAM CHEST 2 VIEWS: CPT

## 2023-05-22 PROCEDURE — 81003 URINALYSIS AUTO W/O SCOPE: CPT

## 2023-05-22 PROCEDURE — 86900 BLOOD TYPING SEROLOGIC ABO: CPT

## 2023-05-22 PROCEDURE — 86901 BLOOD TYPING SEROLOGIC RH(D): CPT

## 2023-05-22 PROCEDURE — 93010 ELECTROCARDIOGRAM REPORT: CPT | Performed by: INTERNAL MEDICINE

## 2023-05-22 PROCEDURE — 85610 PROTHROMBIN TIME: CPT

## 2023-05-22 PROCEDURE — 85025 COMPLETE CBC W/AUTO DIFF WBC: CPT

## 2023-06-07 ENCOUNTER — APPOINTMENT (OUTPATIENT)
Dept: RADIOLOGY | Facility: MEDICAL CENTER | Age: 58
End: 2023-06-07
Attending: NEUROLOGICAL SURGERY
Payer: COMMERCIAL

## 2023-06-07 ENCOUNTER — ANESTHESIA (OUTPATIENT)
Dept: SURGERY | Facility: MEDICAL CENTER | Age: 58
End: 2023-06-07
Payer: COMMERCIAL

## 2023-06-07 ENCOUNTER — ANESTHESIA EVENT (OUTPATIENT)
Dept: SURGERY | Facility: MEDICAL CENTER | Age: 58
End: 2023-06-07
Payer: COMMERCIAL

## 2023-06-07 ENCOUNTER — HOSPITAL ENCOUNTER (OUTPATIENT)
Facility: MEDICAL CENTER | Age: 58
End: 2023-06-08
Attending: NEUROLOGICAL SURGERY | Admitting: NEUROLOGICAL SURGERY
Payer: COMMERCIAL

## 2023-06-07 DIAGNOSIS — G89.18 POSTOPERATIVE PAIN: ICD-10-CM

## 2023-06-07 DIAGNOSIS — M48.062 NEUROGENIC CLAUDICATION DUE TO LUMBAR SPINAL STENOSIS: ICD-10-CM

## 2023-06-07 LAB — ABO + RH BLD: NORMAL

## 2023-06-07 PROCEDURE — 63047 LAM FACETEC & FORAMOT LUMBAR: CPT | Performed by: NEUROLOGICAL SURGERY

## 2023-06-07 PROCEDURE — A9270 NON-COVERED ITEM OR SERVICE: HCPCS | Performed by: STUDENT IN AN ORGANIZED HEALTH CARE EDUCATION/TRAINING PROGRAM

## 2023-06-07 PROCEDURE — 00630 ANES PX LUMBAR REGION NOS: CPT | Performed by: STUDENT IN AN ORGANIZED HEALTH CARE EDUCATION/TRAINING PROGRAM

## 2023-06-07 PROCEDURE — 63048 LAM FACETEC &FORAMOT EA ADDL: CPT | Performed by: NEUROLOGICAL SURGERY

## 2023-06-07 PROCEDURE — 700105 HCHG RX REV CODE 258: Performed by: PHYSICIAN ASSISTANT

## 2023-06-07 PROCEDURE — A4306 DRUG DELIVERY SYSTEM <=50 ML: HCPCS | Performed by: NEUROLOGICAL SURGERY

## 2023-06-07 PROCEDURE — 700111 HCHG RX REV CODE 636 W/ 250 OVERRIDE (IP): Performed by: STUDENT IN AN ORGANIZED HEALTH CARE EDUCATION/TRAINING PROGRAM

## 2023-06-07 PROCEDURE — 700102 HCHG RX REV CODE 250 W/ 637 OVERRIDE(OP): Performed by: PHYSICIAN ASSISTANT

## 2023-06-07 PROCEDURE — 63030 LAMOT DCMPRN NRV RT 1 LMBR: CPT | Mod: ASROC | Performed by: PHYSICIAN ASSISTANT

## 2023-06-07 PROCEDURE — 160048 HCHG OR STATISTICAL LEVEL 1-5: Performed by: NEUROLOGICAL SURGERY

## 2023-06-07 PROCEDURE — 63030 LAMOT DCMPRN NRV RT 1 LMBR: CPT | Performed by: NEUROLOGICAL SURGERY

## 2023-06-07 PROCEDURE — 160029 HCHG SURGERY MINUTES - 1ST 30 MINS LEVEL 4: Performed by: NEUROLOGICAL SURGERY

## 2023-06-07 PROCEDURE — 63047 LAM FACETEC & FORAMOT LUMBAR: CPT | Mod: ASROC | Performed by: PHYSICIAN ASSISTANT

## 2023-06-07 PROCEDURE — A9270 NON-COVERED ITEM OR SERVICE: HCPCS | Performed by: PHYSICIAN ASSISTANT

## 2023-06-07 PROCEDURE — 700111 HCHG RX REV CODE 636 W/ 250 OVERRIDE (IP): Performed by: NEUROLOGICAL SURGERY

## 2023-06-07 PROCEDURE — 700101 HCHG RX REV CODE 250: Performed by: PHYSICIAN ASSISTANT

## 2023-06-07 PROCEDURE — 700105 HCHG RX REV CODE 258: Performed by: STUDENT IN AN ORGANIZED HEALTH CARE EDUCATION/TRAINING PROGRAM

## 2023-06-07 PROCEDURE — 69990 MICROSURGERY ADD-ON: CPT | Performed by: NEUROLOGICAL SURGERY

## 2023-06-07 PROCEDURE — 700106 HCHG RX REV CODE 271: Performed by: NEUROLOGICAL SURGERY

## 2023-06-07 PROCEDURE — G0378 HOSPITAL OBSERVATION PER HR: HCPCS

## 2023-06-07 PROCEDURE — 69990 MICROSURGERY ADD-ON: CPT | Mod: ASROC | Performed by: PHYSICIAN ASSISTANT

## 2023-06-07 PROCEDURE — 160009 HCHG ANES TIME/MIN: Performed by: NEUROLOGICAL SURGERY

## 2023-06-07 PROCEDURE — 110454 HCHG SHELL REV 250: Performed by: NEUROLOGICAL SURGERY

## 2023-06-07 PROCEDURE — 72020 X-RAY EXAM OF SPINE 1 VIEW: CPT

## 2023-06-07 PROCEDURE — 700105 HCHG RX REV CODE 258: Performed by: NEUROLOGICAL SURGERY

## 2023-06-07 PROCEDURE — 700111 HCHG RX REV CODE 636 W/ 250 OVERRIDE (IP): Performed by: PHYSICIAN ASSISTANT

## 2023-06-07 PROCEDURE — 63048 LAM FACETEC &FORAMOT EA ADDL: CPT | Mod: ASROC | Performed by: PHYSICIAN ASSISTANT

## 2023-06-07 PROCEDURE — 700102 HCHG RX REV CODE 250 W/ 637 OVERRIDE(OP): Performed by: STUDENT IN AN ORGANIZED HEALTH CARE EDUCATION/TRAINING PROGRAM

## 2023-06-07 PROCEDURE — 700101 HCHG RX REV CODE 250: Performed by: NEUROLOGICAL SURGERY

## 2023-06-07 PROCEDURE — 160041 HCHG SURGERY MINUTES - EA ADDL 1 MIN LEVEL 4: Performed by: NEUROLOGICAL SURGERY

## 2023-06-07 PROCEDURE — 160035 HCHG PACU - 1ST 60 MINS PHASE I: Performed by: NEUROLOGICAL SURGERY

## 2023-06-07 PROCEDURE — 700101 HCHG RX REV CODE 250: Performed by: STUDENT IN AN ORGANIZED HEALTH CARE EDUCATION/TRAINING PROGRAM

## 2023-06-07 PROCEDURE — 96365 THER/PROPH/DIAG IV INF INIT: CPT

## 2023-06-07 PROCEDURE — 110371 HCHG SHELL REV 272: Performed by: NEUROLOGICAL SURGERY

## 2023-06-07 PROCEDURE — 160002 HCHG RECOVERY MINUTES (STAT): Performed by: NEUROLOGICAL SURGERY

## 2023-06-07 RX ORDER — BACLOFEN 10 MG/1
5 TABLET ORAL 3 TIMES DAILY PRN
Status: DISCONTINUED | OUTPATIENT
Start: 2023-06-07 | End: 2023-06-08 | Stop reason: HOSPADM

## 2023-06-07 RX ORDER — DOCUSATE SODIUM 100 MG/1
100 CAPSULE, LIQUID FILLED ORAL 2 TIMES DAILY
Status: DISCONTINUED | OUTPATIENT
Start: 2023-06-07 | End: 2023-06-08 | Stop reason: HOSPADM

## 2023-06-07 RX ORDER — DIPHENHYDRAMINE HYDROCHLORIDE 50 MG/ML
12.5 INJECTION INTRAMUSCULAR; INTRAVENOUS
Status: DISCONTINUED | OUTPATIENT
Start: 2023-06-07 | End: 2023-06-07 | Stop reason: HOSPADM

## 2023-06-07 RX ORDER — GABAPENTIN 400 MG/1
800 CAPSULE ORAL 3 TIMES DAILY
Status: DISCONTINUED | OUTPATIENT
Start: 2023-06-07 | End: 2023-06-08 | Stop reason: HOSPADM

## 2023-06-07 RX ORDER — HYDROMORPHONE HYDROCHLORIDE 1 MG/ML
0.5 INJECTION, SOLUTION INTRAMUSCULAR; INTRAVENOUS; SUBCUTANEOUS
Status: DISCONTINUED | OUTPATIENT
Start: 2023-06-07 | End: 2023-06-08 | Stop reason: HOSPADM

## 2023-06-07 RX ORDER — OXYCODONE HYDROCHLORIDE 10 MG/1
10 TABLET ORAL
Status: DISCONTINUED | OUTPATIENT
Start: 2023-06-07 | End: 2023-06-08 | Stop reason: HOSPADM

## 2023-06-07 RX ORDER — VANCOMYCIN HYDROCHLORIDE 1 G/20ML
INJECTION, POWDER, LYOPHILIZED, FOR SOLUTION INTRAVENOUS
Status: COMPLETED | OUTPATIENT
Start: 2023-06-07 | End: 2023-06-07

## 2023-06-07 RX ORDER — TRANEXAMIC ACID 100 MG/ML
INJECTION, SOLUTION INTRAVENOUS PRN
Status: DISCONTINUED | OUTPATIENT
Start: 2023-06-07 | End: 2023-06-07 | Stop reason: SURG

## 2023-06-07 RX ORDER — OXYCODONE HCL 5 MG/5 ML
5 SOLUTION, ORAL ORAL
Status: COMPLETED | OUTPATIENT
Start: 2023-06-07 | End: 2023-06-07

## 2023-06-07 RX ORDER — EPHEDRINE SULFATE 50 MG/ML
5 INJECTION, SOLUTION INTRAVENOUS
Status: DISCONTINUED | OUTPATIENT
Start: 2023-06-07 | End: 2023-06-07 | Stop reason: HOSPADM

## 2023-06-07 RX ORDER — TESTOSTERONE CYPIONATE 200 MG/ML
50 INJECTION, SOLUTION INTRAMUSCULAR
COMMUNITY
End: 2023-08-14

## 2023-06-07 RX ORDER — ONDANSETRON 2 MG/ML
INJECTION INTRAMUSCULAR; INTRAVENOUS PRN
Status: DISCONTINUED | OUTPATIENT
Start: 2023-06-07 | End: 2023-06-07 | Stop reason: SURG

## 2023-06-07 RX ORDER — ONDANSETRON 2 MG/ML
4 INJECTION INTRAMUSCULAR; INTRAVENOUS EVERY 4 HOURS PRN
Status: DISCONTINUED | OUTPATIENT
Start: 2023-06-07 | End: 2023-06-08 | Stop reason: HOSPADM

## 2023-06-07 RX ORDER — OXYCODONE HYDROCHLORIDE 5 MG/1
5 TABLET ORAL
Status: DISCONTINUED | OUTPATIENT
Start: 2023-06-07 | End: 2023-06-08 | Stop reason: HOSPADM

## 2023-06-07 RX ORDER — ACETAMINOPHEN 500 MG
1000 TABLET ORAL EVERY 6 HOURS
Status: DISCONTINUED | OUTPATIENT
Start: 2023-06-07 | End: 2023-06-08 | Stop reason: HOSPADM

## 2023-06-07 RX ORDER — MEPERIDINE HYDROCHLORIDE 25 MG/ML
12.5 INJECTION INTRAMUSCULAR; INTRAVENOUS; SUBCUTANEOUS
Status: DISCONTINUED | OUTPATIENT
Start: 2023-06-07 | End: 2023-06-07 | Stop reason: HOSPADM

## 2023-06-07 RX ORDER — SODIUM CHLORIDE, SODIUM LACTATE, POTASSIUM CHLORIDE, CALCIUM CHLORIDE 600; 310; 30; 20 MG/100ML; MG/100ML; MG/100ML; MG/100ML
INJECTION, SOLUTION INTRAVENOUS CONTINUOUS
Status: ACTIVE | OUTPATIENT
Start: 2023-06-07 | End: 2023-06-07

## 2023-06-07 RX ORDER — MIDAZOLAM HYDROCHLORIDE 1 MG/ML
1 INJECTION INTRAMUSCULAR; INTRAVENOUS
Status: DISCONTINUED | OUTPATIENT
Start: 2023-06-07 | End: 2023-06-07 | Stop reason: HOSPADM

## 2023-06-07 RX ORDER — ACETAMINOPHEN 500 MG
1000 TABLET ORAL EVERY 6 HOURS PRN
Status: DISCONTINUED | OUTPATIENT
Start: 2023-06-12 | End: 2023-06-08 | Stop reason: HOSPADM

## 2023-06-07 RX ORDER — SODIUM CHLORIDE AND POTASSIUM CHLORIDE 150; 900 MG/100ML; MG/100ML
INJECTION, SOLUTION INTRAVENOUS CONTINUOUS
Status: DISCONTINUED | OUTPATIENT
Start: 2023-06-07 | End: 2023-06-08 | Stop reason: HOSPADM

## 2023-06-07 RX ORDER — HYDRALAZINE HYDROCHLORIDE 20 MG/ML
5 INJECTION INTRAMUSCULAR; INTRAVENOUS
Status: DISCONTINUED | OUTPATIENT
Start: 2023-06-07 | End: 2023-06-07 | Stop reason: HOSPADM

## 2023-06-07 RX ORDER — LIDOCAINE HYDROCHLORIDE 40 MG/ML
SOLUTION TOPICAL PRN
Status: DISCONTINUED | OUTPATIENT
Start: 2023-06-07 | End: 2023-06-07 | Stop reason: SURG

## 2023-06-07 RX ORDER — ACETAMINOPHEN 500 MG
1000 TABLET ORAL ONCE
Status: COMPLETED | OUTPATIENT
Start: 2023-06-07 | End: 2023-06-07

## 2023-06-07 RX ORDER — ENEMA 19; 7 G/133ML; G/133ML
1 ENEMA RECTAL
Status: DISCONTINUED | OUTPATIENT
Start: 2023-06-07 | End: 2023-06-08 | Stop reason: HOSPADM

## 2023-06-07 RX ORDER — PROMETHAZINE HYDROCHLORIDE 25 MG/1
12.5-25 TABLET ORAL EVERY 4 HOURS PRN
Status: DISCONTINUED | OUTPATIENT
Start: 2023-06-07 | End: 2023-06-08 | Stop reason: HOSPADM

## 2023-06-07 RX ORDER — CEFAZOLIN SODIUM 1 G/3ML
2 INJECTION, POWDER, FOR SOLUTION INTRAMUSCULAR; INTRAVENOUS ONCE
Status: COMPLETED | OUTPATIENT
Start: 2023-06-07 | End: 2023-06-07

## 2023-06-07 RX ORDER — SODIUM CHLORIDE, SODIUM LACTATE, POTASSIUM CHLORIDE, AND CALCIUM CHLORIDE .6; .31; .03; .02 G/100ML; G/100ML; G/100ML; G/100ML
IRRIGANT IRRIGATION
Status: DISCONTINUED | OUTPATIENT
Start: 2023-06-07 | End: 2023-06-07 | Stop reason: HOSPADM

## 2023-06-07 RX ORDER — MORPHINE SULFATE 0.5 MG/ML
INJECTION, SOLUTION EPIDURAL; INTRATHECAL; INTRAVENOUS
Status: DISCONTINUED | OUTPATIENT
Start: 2023-06-07 | End: 2023-06-07 | Stop reason: HOSPADM

## 2023-06-07 RX ORDER — DIPHENHYDRAMINE HCL 25 MG
25 TABLET ORAL EVERY 6 HOURS PRN
Status: DISCONTINUED | OUTPATIENT
Start: 2023-06-07 | End: 2023-06-08 | Stop reason: HOSPADM

## 2023-06-07 RX ORDER — DIPHENHYDRAMINE HYDROCHLORIDE 50 MG/ML
25 INJECTION INTRAMUSCULAR; INTRAVENOUS EVERY 6 HOURS PRN
Status: DISCONTINUED | OUTPATIENT
Start: 2023-06-07 | End: 2023-06-08 | Stop reason: HOSPADM

## 2023-06-07 RX ORDER — DEXAMETHASONE SODIUM PHOSPHATE 4 MG/ML
INJECTION, SOLUTION INTRA-ARTICULAR; INTRALESIONAL; INTRAMUSCULAR; INTRAVENOUS; SOFT TISSUE PRN
Status: DISCONTINUED | OUTPATIENT
Start: 2023-06-07 | End: 2023-06-07 | Stop reason: SURG

## 2023-06-07 RX ORDER — MAGNESIUM HYDROXIDE 1200 MG/15ML
LIQUID ORAL
Status: COMPLETED | OUTPATIENT
Start: 2023-06-07 | End: 2023-06-07

## 2023-06-07 RX ORDER — OXYCODONE HCL 5 MG/5 ML
10 SOLUTION, ORAL ORAL
Status: COMPLETED | OUTPATIENT
Start: 2023-06-07 | End: 2023-06-07

## 2023-06-07 RX ORDER — CEFAZOLIN SODIUM 1 G/3ML
INJECTION, POWDER, FOR SOLUTION INTRAMUSCULAR; INTRAVENOUS
Status: DISCONTINUED | OUTPATIENT
Start: 2023-06-07 | End: 2023-06-07 | Stop reason: HOSPADM

## 2023-06-07 RX ORDER — LABETALOL HYDROCHLORIDE 5 MG/ML
5 INJECTION, SOLUTION INTRAVENOUS
Status: DISCONTINUED | OUTPATIENT
Start: 2023-06-07 | End: 2023-06-07 | Stop reason: HOSPADM

## 2023-06-07 RX ORDER — ONDANSETRON 2 MG/ML
4 INJECTION INTRAMUSCULAR; INTRAVENOUS
Status: DISCONTINUED | OUTPATIENT
Start: 2023-06-07 | End: 2023-06-07 | Stop reason: HOSPADM

## 2023-06-07 RX ORDER — AMOXICILLIN 250 MG
1 CAPSULE ORAL NIGHTLY
Status: DISCONTINUED | OUTPATIENT
Start: 2023-06-07 | End: 2023-06-08 | Stop reason: HOSPADM

## 2023-06-07 RX ORDER — GABAPENTIN 300 MG/1
600 CAPSULE ORAL ONCE
Status: DISCONTINUED | OUTPATIENT
Start: 2023-06-07 | End: 2023-06-07 | Stop reason: HOSPADM

## 2023-06-07 RX ORDER — AMOXICILLIN 250 MG
1 CAPSULE ORAL
Status: DISCONTINUED | OUTPATIENT
Start: 2023-06-07 | End: 2023-06-08 | Stop reason: HOSPADM

## 2023-06-07 RX ORDER — ONDANSETRON 4 MG/1
4 TABLET, ORALLY DISINTEGRATING ORAL EVERY 4 HOURS PRN
Status: DISCONTINUED | OUTPATIENT
Start: 2023-06-07 | End: 2023-06-08 | Stop reason: HOSPADM

## 2023-06-07 RX ORDER — METOPROLOL TARTRATE 1 MG/ML
1 INJECTION, SOLUTION INTRAVENOUS
Status: DISCONTINUED | OUTPATIENT
Start: 2023-06-07 | End: 2023-06-07 | Stop reason: HOSPADM

## 2023-06-07 RX ORDER — POLYETHYLENE GLYCOL 3350 17 G/17G
1 POWDER, FOR SOLUTION ORAL 2 TIMES DAILY PRN
Status: DISCONTINUED | OUTPATIENT
Start: 2023-06-07 | End: 2023-06-08 | Stop reason: HOSPADM

## 2023-06-07 RX ORDER — BISACODYL 10 MG
10 SUPPOSITORY, RECTAL RECTAL
Status: DISCONTINUED | OUTPATIENT
Start: 2023-06-07 | End: 2023-06-08 | Stop reason: HOSPADM

## 2023-06-07 RX ORDER — HYDROMORPHONE HYDROCHLORIDE 2 MG/ML
INJECTION, SOLUTION INTRAMUSCULAR; INTRAVENOUS; SUBCUTANEOUS PRN
Status: DISCONTINUED | OUTPATIENT
Start: 2023-06-07 | End: 2023-06-07 | Stop reason: SURG

## 2023-06-07 RX ORDER — METHYLPREDNISOLONE ACETATE 40 MG/ML
INJECTION, SUSPENSION INTRA-ARTICULAR; INTRALESIONAL; INTRAMUSCULAR; SOFT TISSUE
Status: DISCONTINUED | OUTPATIENT
Start: 2023-06-07 | End: 2023-06-07 | Stop reason: HOSPADM

## 2023-06-07 RX ORDER — HALOPERIDOL 5 MG/ML
1 INJECTION INTRAMUSCULAR
Status: DISCONTINUED | OUTPATIENT
Start: 2023-06-07 | End: 2023-06-07 | Stop reason: HOSPADM

## 2023-06-07 RX ORDER — HYDROMORPHONE HYDROCHLORIDE 1 MG/ML
0.4 INJECTION, SOLUTION INTRAMUSCULAR; INTRAVENOUS; SUBCUTANEOUS
Status: DISCONTINUED | OUTPATIENT
Start: 2023-06-07 | End: 2023-06-07 | Stop reason: HOSPADM

## 2023-06-07 RX ORDER — PROCHLORPERAZINE EDISYLATE 5 MG/ML
5-10 INJECTION INTRAMUSCULAR; INTRAVENOUS EVERY 4 HOURS PRN
Status: DISCONTINUED | OUTPATIENT
Start: 2023-06-07 | End: 2023-06-08 | Stop reason: HOSPADM

## 2023-06-07 RX ORDER — HYDROMORPHONE HYDROCHLORIDE 1 MG/ML
0.2 INJECTION, SOLUTION INTRAMUSCULAR; INTRAVENOUS; SUBCUTANEOUS
Status: DISCONTINUED | OUTPATIENT
Start: 2023-06-07 | End: 2023-06-07 | Stop reason: HOSPADM

## 2023-06-07 RX ORDER — BUPIVACAINE HYDROCHLORIDE AND EPINEPHRINE 5; 5 MG/ML; UG/ML
INJECTION, SOLUTION EPIDURAL; INTRACAUDAL; PERINEURAL
Status: DISCONTINUED | OUTPATIENT
Start: 2023-06-07 | End: 2023-06-07 | Stop reason: HOSPADM

## 2023-06-07 RX ORDER — LABETALOL HYDROCHLORIDE 5 MG/ML
10 INJECTION, SOLUTION INTRAVENOUS
Status: DISCONTINUED | OUTPATIENT
Start: 2023-06-07 | End: 2023-06-08 | Stop reason: HOSPADM

## 2023-06-07 RX ORDER — PROMETHAZINE HYDROCHLORIDE 25 MG/1
12.5-25 SUPPOSITORY RECTAL EVERY 4 HOURS PRN
Status: DISCONTINUED | OUTPATIENT
Start: 2023-06-07 | End: 2023-06-08 | Stop reason: HOSPADM

## 2023-06-07 RX ORDER — HYDROMORPHONE HYDROCHLORIDE 1 MG/ML
0.1 INJECTION, SOLUTION INTRAMUSCULAR; INTRAVENOUS; SUBCUTANEOUS
Status: DISCONTINUED | OUTPATIENT
Start: 2023-06-07 | End: 2023-06-07 | Stop reason: HOSPADM

## 2023-06-07 RX ORDER — LIDOCAINE HYDROCHLORIDE 20 MG/ML
INJECTION, SOLUTION EPIDURAL; INFILTRATION; INTRACAUDAL; PERINEURAL PRN
Status: DISCONTINUED | OUTPATIENT
Start: 2023-06-07 | End: 2023-06-07 | Stop reason: SURG

## 2023-06-07 RX ADMIN — OXYCODONE HYDROCHLORIDE 10 MG: 10 TABLET ORAL at 20:39

## 2023-06-07 RX ADMIN — TRANEXAMIC ACID 1000 MG: 100 INJECTION, SOLUTION INTRAVENOUS at 13:53

## 2023-06-07 RX ADMIN — FENTANYL CITRATE 50 MCG: 50 INJECTION, SOLUTION INTRAMUSCULAR; INTRAVENOUS at 13:46

## 2023-06-07 RX ADMIN — SENNOSIDES AND DOCUSATE SODIUM 1 TABLET: 50; 8.6 TABLET ORAL at 20:40

## 2023-06-07 RX ADMIN — HYDROMORPHONE HYDROCHLORIDE 0.2 MG: 1 INJECTION, SOLUTION INTRAMUSCULAR; INTRAVENOUS; SUBCUTANEOUS at 16:05

## 2023-06-07 RX ADMIN — OXYCODONE HYDROCHLORIDE 10 MG: 5 SOLUTION ORAL at 15:48

## 2023-06-07 RX ADMIN — HYDROMORPHONE HYDROCHLORIDE 0.4 MG: 1 INJECTION, SOLUTION INTRAMUSCULAR; INTRAVENOUS; SUBCUTANEOUS at 16:00

## 2023-06-07 RX ADMIN — FENTANYL CITRATE 50 MCG: 50 INJECTION, SOLUTION INTRAMUSCULAR; INTRAVENOUS at 15:17

## 2023-06-07 RX ADMIN — ACETAMINOPHEN 1000 MG: 500 TABLET, FILM COATED ORAL at 20:39

## 2023-06-07 RX ADMIN — HYDROMORPHONE HYDROCHLORIDE 0.4 MG: 1 INJECTION, SOLUTION INTRAMUSCULAR; INTRAVENOUS; SUBCUTANEOUS at 15:49

## 2023-06-07 RX ADMIN — Medication 150 MG: at 13:47

## 2023-06-07 RX ADMIN — DOCUSATE SODIUM 100 MG: 100 CAPSULE, LIQUID FILLED ORAL at 20:39

## 2023-06-07 RX ADMIN — CEFAZOLIN 2 G: 2 INJECTION, POWDER, FOR SOLUTION INTRAMUSCULAR; INTRAVENOUS at 20:48

## 2023-06-07 RX ADMIN — MIDAZOLAM HYDROCHLORIDE 1 MG: 1 INJECTION, SOLUTION INTRAMUSCULAR; INTRAVENOUS at 16:36

## 2023-06-07 RX ADMIN — ONDANSETRON 4 MG: 2 INJECTION INTRAMUSCULAR; INTRAVENOUS at 13:49

## 2023-06-07 RX ADMIN — GABAPENTIN 800 MG: 400 CAPSULE ORAL at 16:47

## 2023-06-07 RX ADMIN — CEFAZOLIN 2 G: 1 INJECTION, POWDER, FOR SOLUTION INTRAMUSCULAR; INTRAVENOUS at 13:48

## 2023-06-07 RX ADMIN — GABAPENTIN 800 MG: 400 CAPSULE ORAL at 20:39

## 2023-06-07 RX ADMIN — LIDOCAINE HYDROCHLORIDE 4 ML: 40 SOLUTION TOPICAL at 13:48

## 2023-06-07 RX ADMIN — SODIUM CHLORIDE, POTASSIUM CHLORIDE, SODIUM LACTATE AND CALCIUM CHLORIDE: 600; 310; 30; 20 INJECTION, SOLUTION INTRAVENOUS at 13:43

## 2023-06-07 RX ADMIN — ACETAMINOPHEN 1000 MG: 500 TABLET, FILM COATED ORAL at 12:27

## 2023-06-07 RX ADMIN — DEXAMETHASONE SODIUM PHOSPHATE 10 MG: 4 INJECTION INTRA-ARTICULAR; INTRALESIONAL; INTRAMUSCULAR; INTRAVENOUS; SOFT TISSUE at 13:49

## 2023-06-07 RX ADMIN — HYDROMORPHONE HYDROCHLORIDE 0.4 MG: 1 INJECTION, SOLUTION INTRAMUSCULAR; INTRAVENOUS; SUBCUTANEOUS at 17:55

## 2023-06-07 RX ADMIN — PHENYLEPHRINE HYDROCHLORIDE 0.25 MCG/KG/MIN: 10 INJECTION INTRAVENOUS at 14:19

## 2023-06-07 RX ADMIN — HYDROMORPHONE HYDROCHLORIDE 0.4 MG: 1 INJECTION, SOLUTION INTRAMUSCULAR; INTRAVENOUS; SUBCUTANEOUS at 18:00

## 2023-06-07 RX ADMIN — ROCURONIUM BROMIDE 50 MG: 10 INJECTION, SOLUTION INTRAVENOUS at 13:47

## 2023-06-07 RX ADMIN — HYDROMORPHONE HYDROCHLORIDE 0.6 MG: 2 INJECTION INTRAMUSCULAR; INTRAVENOUS; SUBCUTANEOUS at 13:47

## 2023-06-07 RX ADMIN — Medication: at 15:30

## 2023-06-07 RX ADMIN — SUGAMMADEX 200 MG: 100 INJECTION, SOLUTION INTRAVENOUS at 15:19

## 2023-06-07 RX ADMIN — MIDAZOLAM 2 MG: 1 INJECTION, SOLUTION INTRAMUSCULAR; INTRAVENOUS at 13:38

## 2023-06-07 RX ADMIN — LIDOCAINE HYDROCHLORIDE 100 MG: 20 INJECTION, SOLUTION EPIDURAL; INFILTRATION; INTRACAUDAL at 13:47

## 2023-06-07 RX ADMIN — TRANEXAMIC ACID 1000 MG: 100 INJECTION, SOLUTION INTRAVENOUS at 15:00

## 2023-06-07 RX ADMIN — PROPOFOL 200 MG: 10 INJECTION, EMULSION INTRAVENOUS at 13:47

## 2023-06-07 RX ADMIN — POTASSIUM CHLORIDE AND SODIUM CHLORIDE: 900; 150 INJECTION, SOLUTION INTRAVENOUS at 20:48

## 2023-06-07 ASSESSMENT — FIBROSIS 4 INDEX
FIB4 SCORE: 1.11
FIB4 SCORE: 1.11

## 2023-06-07 ASSESSMENT — PAIN DESCRIPTION - PAIN TYPE
TYPE: SURGICAL PAIN
TYPE: ACUTE PAIN
TYPE: SURGICAL PAIN

## 2023-06-07 NOTE — ANESTHESIA TIME REPORT
Anesthesia Start and Stop Event Times     Date Time Event    6/7/2023 1330 Ready for Procedure     1343 Anesthesia Start     1533 Anesthesia Stop        Responsible Staff  06/07/23    Name Role Begin End    Artemio Dos Santos M.D. Anesth 1343 1533        Overtime Reason:  overtime    Comments:

## 2023-06-07 NOTE — ANESTHESIA PROCEDURE NOTES
Airway    Date/Time: 6/7/2023 1:48 PM    Performed by: Artemio Dos Santos M.D.  Authorized by: Artemio Dos Santos M.D.    Location:  OR  Urgency:  Elective  Indications for Airway Management:  Anesthesia      Spontaneous Ventilation: absent    Sedation Level:  Deep  Preoxygenated: Yes    Patient Position:  Sniffing  Final Airway Type:  Endotracheal airway  Final Endotracheal Airway:  ETT  Cuffed: Yes    Technique Used for Successful ETT Placement:  Direct laryngoscopy    Insertion Site:  Oral  Blade Type:  Aldana  Laryngoscope Blade/Videolaryngoscope Blade Size:  2  ETT Size (mm):  8.0  Measured from:  Teeth  ETT to Teeth (cm):  24  Placement Verified by: auscultation and capnometry    Cormack-Lehane Classification:  Grade I - full view of glottis  Number of Attempts at Approach:  1  Ventilation Between Attempts:  None  Number of Other Approaches Attempted:  0

## 2023-06-07 NOTE — PROGRESS NOTES
No change to my last H and P (it may be labelled a progress note or a H and P in EPIC). The note was made by either myself, or my PAs Ron Gonzales or Gideon Cardona but is signed by me.If it was done by my physician assistant, I verify it is correct and has my co-signature.    Manasa Benoit MD PhD SHARNY
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
## 2023-06-07 NOTE — OR SURGEON
Immediate Post OP Note    PreOp Diagnosis:     Severe spinal stenosis L3-4 and L4-5  2.  Left L3-4 disc herniation  3.  Left foraminal and lateral recess stenosis L4-5 4.  Possible cervical stenosis      PostOp Diagnosis:     Severe spinal stenosis L3-4 and L4-5  2.  Left L3-4 disc herniation  3.  Left foraminal and lateral recess stenosis L4-5 4.  Possible cervical stenosis      Procedure(s):  L3-4, L4-5, L5-S1 decompressive laminectomy bilateral foraminotomies with a left L3-4 discectomy - Wound Class: Clean with Drain    Surgeon(s):  Manasa Benoit M.D.    Anesthesiologist/Type of Anesthesia:  Anesthesiologist: Artemio Dos Santos M.D./General    Surgical Staff:  Circulator: Lukas D. Gansert, R.N.  Scrub Person: Sue Morgan Assist: Ron Gonzales P.A.-C.  Radiology Technologist: Lizbeth Aldana    Specimens removed if any:  * No specimens in log *    Assistants: MANE SaldivarCSpecimen: nil    Estimated Blood Loss: 100 cc    Findings: n/a    Complications: nil         6/7/2023 3:23 PM Manasa Benoit M.D.

## 2023-06-07 NOTE — ANESTHESIA PREPROCEDURE EVALUATION
Case: [id number] Date/Time: 06/07/23 1315    Procedure: L3-4, L4-5, L5-S1 decompressive laminectomy bilateral foraminotomies with a left L3-4 discectomy    Diagnosis: Neurogenic claudication due to lumbar spinal stenosis [M48.062]    Pre-op diagnosis: Neurogenic claudication due to lumbar spinal stenosis [M48.062]    Location: [place] [address] / SURGERY [facility]    Surgeons: Manasa Benoit M.D.          Relevant Problems   CARDIAC   (positive) Pulmonary embolism, bilateral (HCC)       Physical Exam    Airway   Mallampati: II  TM distance: >3 FB  Neck ROM: full       Cardiovascular - normal exam  Rhythm: regular  Rate: normal  (-) murmur     Dental - normal exam           Pulmonary - normal exam  Breath sounds clear to auscultation     Abdominal    Neurological - normal exam                 Anesthesia Plan    ASA 3   ASA physical status 3 criteria: a thrombophilic disease requiring anticoagulation    Plan - general       Airway plan will be ETT          Induction: intravenous    Postoperative Plan: Postoperative administration of opioids is intended.    Pertinent diagnostic labs and testing reviewed    Informed Consent:    Anesthetic plan and risks discussed with patient.    Use of blood products discussed with: patient whom consented to blood products.

## 2023-06-07 NOTE — LETTER
April 7, 2023    Patient Name: Yang Sánchez  Surgeon Name: Manasa Benoit M.D.  Surgery Facility: [facility], [facility] ([address] [facility])  Surgery Date: 6/7/2023    The time of your surgery is not final and may change up to and until the day of your surgery. You will be contacted 1-2 business days prior to your surgery date with your check-in and surgery time.    BEFORE YOUR SURGERY - WITH THE FACILITY  Pre Registration and/or Lab Work/Tests must be done within 28 days of your surgery date. These will be done at [facility], with an appointment. This appointment should be completed before your pre op appointment in our office.    On 5.3.23 - if you have not already heard from [facility] Pre Admission/Registration Department, please call them at [phone] option 2, then option 1, for an appointment.      BEFORE YOUR SURGERY - WITH YOUR SURGEONS OFFICE  Pre op Appointment:   Date: 5.25.23   Time: 12:00pm   Provider: Ron Gonzales PA-C   Location: [address] [place] [address]    Bring a list of all medications you are currently taking including the dosing and frequency.    Please read the MEDICATION INSTRUCTIONS below completely.    DAY OF YOUR SURGERY  Nothing to eat or drink and refrain from smoking any substance after midnight the day of your surgery. Smoking may interfere with the anesthetic and frequently produces nausea during the recovery period.    Continue taking all lifesaving medications, including the morning of your surgery with small sip of water.    You will need a responsible adult to drive you to and from your surgery.    AFTER YOUR SURGERY  2 Week Post op Appointment:   Date: 6.21.23   Time: 12:30pm  With: Ron Gonzales PA-C  Location: [address] [place], [place] [address]    6 Week Post op Appointment:   Date: 7.20.23   Time: 12:30pm   With: Ron Gonzales PA-C  Location: [address] [place], [place] [address]    MEDICATION INSTRUCTIONS   Do not take these medications prior to your  procedure:  Anti-inflammatories: stop 7 days prior, restart when advised. For fusions avoid for 12 weeks after surgery  Naproxen (Naprosyn or Alleve)  Motrin  Ibuprofen  Nabumetone (Relafen)  Meloxicam (Mobic)  Celebrex  Salsalate  Diclofenac (Arthrotec, Voltaren, Flector)  Sulindac (Clinoril  Etodolac (Lodine)  Indomethacin (Indocin)  Ketoprofen  Ketorolac  Oxaprozin (Daypro)  Piroxicam (Feldene)  Blood thinners (stop after approval from the prescribing physician)  Aspirin (any dosage): 10 days prior, restart 7 days after procedure  Any medications that contain aspirin in combination (ie: Excedrin migraine, Fiorinal, and Norgesic)  Warfarin (Coumadin): Stop 7 days prior, INR day of procedure, restart 2-3 weeks after procedure  Antiplatelet: restart 7 days after procedure  Ticlid (ticlopidine): Stop 14 days prior  Plavix (clopidogrel): Stop 7 days prior  Aggrenox or Dipyridamole: Stop 14 days prior  ReoPro (abciximab): Stop 14 days prior  Integrilin (eptifibatide): Stop 14 days prior  Aggrastat (tirofiban): Stop 14 days prior  Lovenox (Enoxaparin): Stop 24hrs before and restart 24hrs after procedure  Heparin: Stop 24hrs before   Dalteparin (Fragmin): Stop 24hrs before  Fondaparinux (Arixtra): Stop 24hrs before  Xeralto, Dabigatran (Pradaxa) Stop 5 days prior  Eliquis (apibaxan) Stop 7 days prior    Okay to take these medications as prescribed:  Muscle relaxers  Acetaminophen and pain medications that have it in addition to oxycodone and hydrocodone  Blood pressure, cholesterol and diabetes medications are ok    TIME OFF WORK  FMLA or Disability forms can be faxed directly to [phone] or you may drop them off at any [facility]. Our office charges a $35.00 fee. Forms will be completed within 5-10 business days after payment is received. For the status of your forms you may contact our disability office directly at [phone].    DENTAL PROCDURES/CLEANINGS Avoid 3 weeks before surgery and for 3  months after surgery.    QUESTIONS ABOUT COSTS  Contact our Patient Financial Services department at [phone] for more information.    If you have any questions, please contact our office.    June    Surgery Scheduler  [email or web address]  ? [phone]   Fax: [phone]  EXT [phone]  [place] [address]  [phone]

## 2023-06-08 ENCOUNTER — PHARMACY VISIT (OUTPATIENT)
Dept: PHARMACY | Facility: MEDICAL CENTER | Age: 58
End: 2023-06-08
Payer: COMMERCIAL

## 2023-06-08 VITALS
HEIGHT: 72 IN | WEIGHT: 248.24 LBS | TEMPERATURE: 98.2 F | DIASTOLIC BLOOD PRESSURE: 94 MMHG | SYSTOLIC BLOOD PRESSURE: 143 MMHG | OXYGEN SATURATION: 92 % | BODY MASS INDEX: 33.62 KG/M2 | HEART RATE: 98 BPM | RESPIRATION RATE: 18 BRPM

## 2023-06-08 LAB
ANION GAP SERPL CALC-SCNC: 9 MMOL/L (ref 7–16)
BUN SERPL-MCNC: 18 MG/DL (ref 8–22)
CALCIUM SERPL-MCNC: 8.2 MG/DL (ref 8.5–10.5)
CHLORIDE SERPL-SCNC: 107 MMOL/L (ref 96–112)
CO2 SERPL-SCNC: 24 MMOL/L (ref 20–33)
CREAT SERPL-MCNC: 1.22 MG/DL (ref 0.5–1.4)
ERYTHROCYTE [DISTWIDTH] IN BLOOD BY AUTOMATED COUNT: 41.4 FL (ref 35.9–50)
GFR SERPLBLD CREATININE-BSD FMLA CKD-EPI: 69 ML/MIN/1.73 M 2
GLUCOSE SERPL-MCNC: 138 MG/DL (ref 65–99)
HCT VFR BLD AUTO: 37.6 % (ref 42–52)
HGB BLD-MCNC: 12.8 G/DL (ref 14–18)
MCH RBC QN AUTO: 31.7 PG (ref 27–33)
MCHC RBC AUTO-ENTMCNC: 34 G/DL (ref 32.3–36.5)
MCV RBC AUTO: 93.1 FL (ref 81.4–97.8)
PLATELET # BLD AUTO: 143 K/UL (ref 164–446)
PMV BLD AUTO: 10.3 FL (ref 9–12.9)
POTASSIUM SERPL-SCNC: 4.4 MMOL/L (ref 3.6–5.5)
RBC # BLD AUTO: 4.04 M/UL (ref 4.7–6.1)
SODIUM SERPL-SCNC: 140 MMOL/L (ref 135–145)
WBC # BLD AUTO: 9.1 K/UL (ref 4.8–10.8)

## 2023-06-08 PROCEDURE — 99024 POSTOP FOLLOW-UP VISIT: CPT | Performed by: PHYSICIAN ASSISTANT

## 2023-06-08 PROCEDURE — 97166 OT EVAL MOD COMPLEX 45 MIN: CPT

## 2023-06-08 PROCEDURE — 85027 COMPLETE CBC AUTOMATED: CPT

## 2023-06-08 PROCEDURE — G0378 HOSPITAL OBSERVATION PER HR: HCPCS

## 2023-06-08 PROCEDURE — 700105 HCHG RX REV CODE 258: Performed by: PHYSICIAN ASSISTANT

## 2023-06-08 PROCEDURE — 700111 HCHG RX REV CODE 636 W/ 250 OVERRIDE (IP): Performed by: PHYSICIAN ASSISTANT

## 2023-06-08 PROCEDURE — 96372 THER/PROPH/DIAG INJ SC/IM: CPT

## 2023-06-08 PROCEDURE — 700102 HCHG RX REV CODE 250 W/ 637 OVERRIDE(OP): Performed by: PHYSICIAN ASSISTANT

## 2023-06-08 PROCEDURE — RXMED WILLOW AMBULATORY MEDICATION CHARGE: Performed by: PHYSICIAN ASSISTANT

## 2023-06-08 PROCEDURE — 96375 TX/PRO/DX INJ NEW DRUG ADDON: CPT

## 2023-06-08 PROCEDURE — 97162 PT EVAL MOD COMPLEX 30 MIN: CPT

## 2023-06-08 PROCEDURE — 80048 BASIC METABOLIC PNL TOTAL CA: CPT

## 2023-06-08 PROCEDURE — A9270 NON-COVERED ITEM OR SERVICE: HCPCS | Performed by: PHYSICIAN ASSISTANT

## 2023-06-08 RX ORDER — ENOXAPARIN SODIUM 100 MG/ML
40 INJECTION SUBCUTANEOUS ONCE
Status: COMPLETED | OUTPATIENT
Start: 2023-06-08 | End: 2023-06-08

## 2023-06-08 RX ORDER — ENOXAPARIN SODIUM 100 MG/ML
40 INJECTION SUBCUTANEOUS DAILY
Qty: 7 EACH | Refills: 0 | Status: ACTIVE | OUTPATIENT
Start: 2023-06-08

## 2023-06-08 RX ORDER — OXYCODONE HYDROCHLORIDE AND ACETAMINOPHEN 5; 325 MG/1; MG/1
1-2 TABLET ORAL EVERY 6 HOURS PRN
Qty: 56 TABLET | Refills: 0 | Status: SHIPPED | OUTPATIENT
Start: 2023-06-08 | End: 2023-06-15

## 2023-06-08 RX ORDER — CEPHALEXIN 500 MG/1
500 CAPSULE ORAL 4 TIMES DAILY
Qty: 20 CAPSULE | Refills: 0 | Status: ACTIVE | OUTPATIENT
Start: 2023-06-08

## 2023-06-08 RX ORDER — METHOCARBAMOL 750 MG/1
750 TABLET, FILM COATED ORAL 3 TIMES DAILY PRN
Qty: 60 TABLET | Refills: 0 | Status: SHIPPED | OUTPATIENT
Start: 2023-06-08

## 2023-06-08 RX ADMIN — OXYCODONE HYDROCHLORIDE 10 MG: 10 TABLET ORAL at 00:33

## 2023-06-08 RX ADMIN — GABAPENTIN 800 MG: 400 CAPSULE ORAL at 15:00

## 2023-06-08 RX ADMIN — CEFAZOLIN 2 G: 2 INJECTION, POWDER, FOR SOLUTION INTRAMUSCULAR; INTRAVENOUS at 03:55

## 2023-06-08 RX ADMIN — OXYCODONE HYDROCHLORIDE 10 MG: 10 TABLET ORAL at 10:13

## 2023-06-08 RX ADMIN — ENOXAPARIN SODIUM 40 MG: 100 INJECTION SUBCUTANEOUS at 14:26

## 2023-06-08 RX ADMIN — HYDROMORPHONE HYDROCHLORIDE 0.5 MG: 1 INJECTION, SOLUTION INTRAMUSCULAR; INTRAVENOUS; SUBCUTANEOUS at 01:34

## 2023-06-08 RX ADMIN — ACETAMINOPHEN 1000 MG: 500 TABLET, FILM COATED ORAL at 12:21

## 2023-06-08 RX ADMIN — ACETAMINOPHEN 1000 MG: 500 TABLET, FILM COATED ORAL at 05:50

## 2023-06-08 RX ADMIN — OXYCODONE HYDROCHLORIDE 10 MG: 10 TABLET ORAL at 13:35

## 2023-06-08 RX ADMIN — CHOLECALCIFEROL TAB 125 MCG (5000 UNIT) 5000 UNITS: 125 TAB at 05:50

## 2023-06-08 RX ADMIN — OXYCODONE HYDROCHLORIDE 10 MG: 10 TABLET ORAL at 04:36

## 2023-06-08 RX ADMIN — DOCUSATE SODIUM 100 MG: 100 CAPSULE, LIQUID FILLED ORAL at 05:50

## 2023-06-08 RX ADMIN — ACETAMINOPHEN 1000 MG: 500 TABLET, FILM COATED ORAL at 00:33

## 2023-06-08 RX ADMIN — BACLOFEN 5 MG: 10 TABLET ORAL at 06:08

## 2023-06-08 RX ADMIN — GABAPENTIN 800 MG: 400 CAPSULE ORAL at 10:13

## 2023-06-08 ASSESSMENT — COGNITIVE AND FUNCTIONAL STATUS - GENERAL
SUGGESTED CMS G CODE MODIFIER DAILY ACTIVITY: CH
MOBILITY SCORE: 22
SUGGESTED CMS G CODE MODIFIER MOBILITY: CJ
DAILY ACTIVITIY SCORE: 24
CLIMB 3 TO 5 STEPS WITH RAILING: A LITTLE
WALKING IN HOSPITAL ROOM: A LITTLE

## 2023-06-08 ASSESSMENT — PAIN DESCRIPTION - PAIN TYPE
TYPE: SURGICAL PAIN
TYPE: ACUTE PAIN
TYPE: ACUTE PAIN

## 2023-06-08 ASSESSMENT — GAIT ASSESSMENTS
ASSISTIVE DEVICE: HAND HELD ASSIST
GAIT LEVEL OF ASSIST: CONTACT GUARD ASSIST
DISTANCE (FEET): 350
DEVIATION: BRADYKINETIC;DECREASED BASE OF SUPPORT;ANTALGIC

## 2023-06-08 ASSESSMENT — ACTIVITIES OF DAILY LIVING (ADL): TOILETING: INDEPENDENT

## 2023-06-08 NOTE — THERAPY
Occupational Therapy   Initial Evaluation     Patient Name: Yang Sánchez  Age:  57 y.o., Sex:  male  Medical Record #: [id number]  Today's Date: 6/8/2023     Precautions  Precautions: (P) Spinal / Back Precautions   Comments: (P) no brace per MD order    Assessment    Patient is 57 y.o. male seen following L3-4, L4-5, L5-S1 decompressive laminectomy/bilateral foraminotomies with a left L3-4 discectomy, no brace per MD order. Pt normally independent with functional mobility and ADLs living in a H with spouse who will be available to assist as needed. Pt able to complete all functional mobility and ADLs with supervision and use of FWW. Pt educated on adaptive strategies for lower body dressing as well as spinal precautions for ADLs. No acute OT needs identified, will complete order at this time.       Plan    DC Equipment Recommendations: (P) None  Discharge Recommendations: (P) Anticipate that the patient will have no further occupational therapy needs after discharge from the hospital     Objective       06/08/23 0902   Prior Living Situation   Prior Services None   Housing / Facility 1 Story House   Steps Into Home 1   Bathroom Set up Walk In Shower   Equipment Owned Single Point Cane   Lives with - Patient's Self Care Capacity Spouse   Comments Spouse available to assist   Prior Level of ADL Function   Self Feeding Independent   Grooming / Hygiene Independent   Bathing Independent   Dressing Independent   Toileting Independent   Prior Level of IADL Function   Medication Management Independent   Laundry Independent   Kitchen Mobility Independent   Finances Independent   Home Management Independent   Shopping Independent   Prior Level Of Mobility Independent Without Device in Community   Driving / Transportation Driving Independent   Occupation (Pre-Hospital Vocational) Employed Full Time   History of Falls   History of Falls No   Precautions   Precautions Spinal / Back Precautions    Comments no brace per MD  order   Pain 0 - 10 Group   Therapist Pain Assessment Post Activity Pain Same as Prior to Activity;Nurse Notified   Cognition    Cognition / Consciousness WDL   Level of Consciousness Alert   Active ROM Upper Body   Active ROM Upper Body  WDL   Dominant Hand Right   Strength Upper Body   Upper Body Strength  WDL   Sensation Upper Body   Upper Extremity Sensation  WDL   Upper Body Muscle Tone   Upper Body Muscle Tone  WDL   Neurological Concerns   Neurological Concerns No   Coordination Upper Body   Coordination WDL   Balance Assessment   Sitting Balance (Static) Fair   Sitting Balance (Dynamic) Fair   Standing Balance (Static) Fair   Standing Balance (Dynamic) Fair   Weight Shift Sitting Fair   Weight Shift Standing Fair   Comments w/ FWW   Bed Mobility    Supine to Sit Supervised   Sit to Supine Supervised   Scooting Supervised   Rolling Supervised   ADL Assessment   Grooming Supervision;Standing   Upper Body Dressing Supervision   Lower Body Dressing Supervision   Toileting Supervision   How much help from another person does the patient currently need...   Putting on and taking off regular lower body clothing? 4   Bathing (including washing, rinsing, and drying)? 4   Toileting, which includes using a toilet, bedpan, or urinal? 4   Putting on and taking off regular upper body clothing? 4   Taking care of personal grooming such as brushing teeth? 4   Eating meals? 4   6 Clicks Daily Activity Score 24   Functional Mobility   Sit to Stand Supervised   Bed, Chair, Wheelchair Transfer Supervised   Toilet Transfers Supervised   Transfer Method Stand Step   Mobility bed mobility, bathroom mobility, up to sink, left in chair   Comments w/ FWW   Activity Tolerance   Sitting in Chair left seated in chair   Standing 10 min   Education Group   Education Provided Role of Occupational Therapist;Spinal Precautions;Activities of Daily Living   Role of Occupational Therapist Patient Response Patient;Acceptance;Explanation   Spinal  Precautions Patient Response Patient;Acceptance;Explanation;Handout   ADL Patient Response Patient;Acceptance;Explanation;Handout   Problem List   Problem List None   Anticipated Discharge Equipment and Recommendations   DC Equipment Recommendations None   Discharge Recommendations Anticipate that the patient will have no further occupational therapy needs after discharge from the hospital   Interdisciplinary Plan of Care Collaboration   IDT Collaboration with  Nursing   Patient Position at End of Therapy Seated;Chair Alarm On;Call Light within Reach;Tray Table within Reach;Phone within Reach   Collaboration Comments RN updated

## 2023-06-08 NOTE — DISCHARGE SUMMARY
Discharge Summary    CHIEF COMPLAINT ON ADMISSION  No chief complaint on file.      Reason for Admission  Neurogenic claudication due to lumbar spinal stenosis  L3-S1 lumbar laminectomy with Left L3-L4 microdiscectomy    Admission Date  6/7/2023    CODE STATUS  Full Code    HPI & HOSPITAL COURSE  This is a 57 y.o. male here with lumbar radiculopathy.  He had MRI and x-ray imaging which demonstrated stenosis L3-S1.  There is a left paracentral disc bulge at L3-L4.  He was describing radiculopathy symptoms.  There was an element of chronic nerve damage with constant numbness and tingling in his legs as well.  His symptoms did progress when he stand and walk.  He failed multiple conservative therapies.  Due to the persisting symptoms despite the conservative therapies he was offered the above-mentioned surgery which she consented to.    He underwent the above operation without complication was transferred to the neuroscience floor.  On postoperative day #1 he was eating, drinking, mobilizing and voiding.  He was hemodynamically and neurologically stable.  His Hemovac drain was discontinued.  He does have a history of pulmonary embolism after COVID infection.  He was given prophylactic Lovenox while in hospital.  He met criteria for discharge on postoperative day #1 and discharged home in stable condition.  No notes on file    Therefore, he is discharged in good and stable condition to home with close outpatient follow-up.    The patient recovered much more quickly than anticipated on admission.    Discharge Date  6/8/2023    FOLLOW UP ITEMS POST DISCHARGE  Follow-up with Dr. Benoit in 2 and 6 weeks  No NSAIDs  He will discharge home with Lovenox to be taken daily x1 week  No activities more strenuous than walking  He may shower in 2 days  He is to call with any questions or concerns    DISCHARGE DIAGNOSES  Principal Problem:    Neurogenic claudication due to lumbar spinal stenosis (POA: Unknown)      Overview: Added  automatically from request for surgery [id number]  Active Problems:    Spinal stenosis, lumbar region with neurogenic claudication (POA: Yes)  Resolved Problems:    * No resolved hospital problems. *      FOLLOW UP  Future Appointments   Date Time Provider Department Center   6/21/2023 12:30 PM HAILEY Coombs [facility]   7/20/2023 12:30 PM HAILEY Coombs [facility]     No follow-up provider specified.    MEDICATIONS ON DISCHARGE     Medication List        START taking these medications        Instructions   cephALEXin 500 MG Caps  Commonly known as: KEFLEX   Take 1 Capsule by mouth 4 times a day.  Dose: 500 mg     enoxaparin 40 MG/0.4ML Sosy inj  Commonly known as: Lovenox   Inject 40 mg under the skin every day.  Dose: 40 mg     methocarbamol 750 MG Tabs  Commonly known as: ROBAXIN   Take 1 Tablet by mouth 3 times a day as needed (muscle spasm).  Dose: 750 mg     oxyCODONE-acetaminophen 5-325 MG Tabs  Commonly known as: PERCOCET   Take 1-2 Tablets by mouth every 6 hours as needed for Moderate Pain for up to 7 days.  Dose: 1-2 Tablet            CHANGE how you take these medications        Instructions   gabapentin 800 MG tablet  What changed: when to take this  Commonly known as: NEURONTIN   TAKE 1 TABLET BY MOUTH THREE TIMES DAILY            CONTINUE taking these medications        Instructions   testosterone cypionate 200 MG/ML Soln injection  Commonly known as: DEPO-TESTOSTERONE   Inject 50 mg into the shoulder, thigh, or buttocks every 7 days. On Sat  Dose: 50 mg            STOP taking these medications      cyclobenzaprine 10 mg Tabs  Commonly known as: Flexeril     MULTIVITAMIN MEN PO     tizanidine 4 MG Tabs  Commonly known as: ZANAFLEX              Allergies  No Known Allergies    DIET  Orders Placed This Encounter   Procedures    Diet Order Diet: Regular     Standing Status:   Standing     Number of Occurrences:   1     Order Specific Question:   Diet:     Answer:    Regular [1]       ACTIVITY  As tolerated.  Weight bearing as tolerated    CONSULTATIONS  None    PROCEDURES  L3-S1 lumbar laminectomy with left L3-L4 discectomy    LABORATORY  Lab Results   Component Value Date    SODIUM 140 06/08/2023    POTASSIUM 4.4 06/08/2023    CHLORIDE 107 06/08/2023    CO2 24 06/08/2023    GLUCOSE 138 (H) 06/08/2023    BUN 18 06/08/2023    CREATININE 1.22 06/08/2023        Lab Results   Component Value Date    WBC 9.1 06/08/2023    HEMOGLOBIN 12.8 (L) 06/08/2023    HEMATOCRIT 37.6 (L) 06/08/2023    PLATELETCT 143 (L) 06/08/2023        Total time of the discharge process exceeds 35 minutes.

## 2023-06-08 NOTE — DISCHARGE INSTRUCTIONS
Laminectomy, Care After  This sheet gives you information about how to care for yourself after your procedure. Your health care provider may also give you more specific instructions. If you have problems or questions, contact your health care provider.  What can I expect after the procedure?  After the procedure, it is common to have:  Some pain around your incision area.  Muscle tightening (spasms) across the back.  Follow these instructions at home:  Incision care    Follow instructions from your health care provider about how to take care of your incision area. Make sure you:  Wash your hands with soap and water before and after you apply medicine to the area or change your bandage (dressing). If soap and water are not available, use hand .  Change your dressing as told by your health care provider.  Leave stitches (sutures), skin glue, or adhesive strips in place. These skin closures may need to stay in place for 2 weeks or longer. If adhesive strip edges start to loosen and curl up, you may trim the loose edges. Do not remove adhesive strips completely unless your health care provider tells you to do that.  Check your incision area every day for signs of infection. Check for:  More redness, swelling, or pain.  More fluid or blood.  Warmth.  Pus or a bad smell.  Medicines  Take over-the-counter and prescription medicines only as told by your health care provider.  If you were prescribed an antibiotic medicine, use it as told by your health care provider. Do not stop using the antibiotic even if you start to feel better.  Bathing  Do not take baths, swim, or use a hot tub for 2 weeks, or until your incision has healed completely.  If your health care provider approves, you may take showers after your dressing has been removed.  Activity    Return to your normal activities as told by your health care provider. Ask your health care provider what activities are safe for you.  Avoid bending or twisting at  your waist. Always bend at your knees.  Do not sit for more than 20-30 minutes at a time. Lie down or walk between periods of sitting.  Do not lift anything that is heavier than 10 lb (4.5 kg) or the limit that your health care provider tells you, until he or she says that it is safe.  Do not drive for 2 weeks after your procedure or for as long as your health care provider tells you.  Do not drive or use heavy machinery while taking prescription pain medicine.  General instructions  To prevent or treat constipation while you are taking prescription pain medicine, your health care provider may recommend that you:  Drink enough fluid to keep your urine clear or pale yellow.  Take over-the-counter or prescription medicines.  Eat foods that are high in fiber, such as fresh fruits and vegetables, whole grains, and beans.  Limit foods that are high in fat and processed sugars, such as fried and sweet foods.  Do breathing exercises as told.  Keep all follow-up visits as told by your health care provider. This is important.  Contact a health care provider if:  You have more redness, swelling, or pain around your incision area.  Your incision feels warm to the touch.  You are not able to return to activities or do exercises as told by your health care provider.  Get help right away if:  You have:  More fluid or blood coming from your incision area.  Pus or a bad smell coming from your incision area.  Chills or a fever.  Episodes of dizziness or fainting while standing.  You develop a rash.  You develop shortness of breath or you have difficulty breathing.  You cannot control when you urinate or have a bowel movement.  You become weak.  You are not able to use your legs.  Summary  After the procedure, it is common to have some pain around your incision area. You may also have muscle tightening (spasms) across the back.  Follow instructions from your health care provider about how to care for your incision.  Do not lift  anything that is heavier than 10 lb (4.5 kg) or the limit that your health care provider tells you, until he or she says that it is safe.  Contact your health care provider if you have more redness, swelling, or pain around your incision area or if your incision feels warm to the touch. These can be signs of infection.  This information is not intended to replace advice given to you by your health care provider. Make sure you discuss any questions you have with your health care provider.  Document Released: 07/07/2006 Document Revised: 11/30/2018 Document Reviewed: 06/04/2017  [employer] Patient Education © 2020 [employer]      Microdiskectomy, Care After  This sheet gives you information about how to care for yourself after your procedure. Your doctor may also give you more specific instructions. If you have problems or questions, call your doctor.  What can I expect after the procedure?  After the procedure, it is common to have:  Pain and stiffness in your back.  Some redness, swelling, and pain around your cut from surgery (incision).  Follow these instructions at home:  Medicines  Take over-the-counter and prescription medicines only as told by your doctor.  Ask your doctor if the medicine prescribed to you:  Requires you to avoid driving or using heavy machinery.  Can cause trouble pooping (constipation). You may need to take these actions to prevent or treat trouble pooping:  Drink enough fluid to keep your pee (urine) pale yellow.  Take over-the-counter or prescription medicines.  Eat foods that are high in fiber. These include beans, whole grains, and fresh fruits and vegetables.  Limit foods that are high in fat and sugar. These include fried or sweet foods.  Surgical cut care    Follow instructions from your doctor about how to take care of your cut from surgery. Make sure you:  Wash your hands with soap and water before and after you change your bandage (dressing). If you cannot use soap and water, use  hand .  Change your bandage as told by your doctor.  Leave stitches (sutures), skin glue, or skin tape (adhesive) strips in place. They may need to stay in place for 2 weeks or longer. If tape strips get loose and curl up, you may trim the loose edges. Do not remove tape strips completely unless your doctor says it is okay.  Keep your cut from surgery clean and dry.  Check the area around your cut from surgery every day for signs of infection. Check for:  More redness, swelling, or pain.  Fluid or blood.  Warmth.  Pus or a bad smell.  Managing pain, stiffness, and swelling    If told, put ice on the painful area.  Put ice in a plastic bag.  Place a towel between your skin and the bag.  Leave the ice on for 20 minutes, 2-3 times per day.  Activity  Rest as told by your doctor.  Do not sit or lie down for a long time without moving. Get up to take short walks every 1-2 hours. This is important. Ask for help if you feel weak or unsteady.  Return to your normal activities as told by your doctor. Ask your doctor what activities are safe for you.  Do not lift anything that is heavier than 10 lb (4.5 kg), or the limit that you are told, until your doctor says that it is safe. Do not lift anything over your head.  Do not twist or bend at the waist until your doctor says it is okay.  Avoid pushing and pulling motions.  Ask your doctor what kinds of exercise you can do to make your back stronger. Do not start to exercise until your doctor says it is okay.  General instructions  Do not use any products that contain nicotine or tobacco, such as cigarettes, e-cigarettes, and chewing tobacco. These can delay healing. If you need help quitting, ask your doctor.  Do not take baths, swim, or use a hot tub until your doctor approves. Ask your doctor if you may take showers. You may only be allowed to take sponge baths.  Wear compression stockings as told by your doctor.  Keep all follow-up visits as told by your doctor or  physical therapist. This is important.  Contact a doctor if:  Your pain gets worse.  Your medicine does not help your pain.  You start to have pain or swelling in your legs or feet.  Your cut from surgery is more red, swollen, or painful.  You have fluid, blood, pus, or a bad smell coming from your cut from surgery.  You feel sick to your stomach (nauseous).  You vomit.  You have trouble controlling when you pee (urinate) or poop (have a bowel movement).  You have a fever.  Get help right away if:  Your pain is very bad.  You have trouble breathing.  You start to have a cough.  You have chest pain.  These symptoms may be an emergency. Do not wait to see if the symptoms will go away. Get medical help right away. Call your local emergency services (911 in the [place] Do not drive yourself to the hospital.  Summary  After your procedure, it is common to have pain and stiffness in your back.  You may also have some redness, swelling, and pain around your cut from surgery.  Take over-the-counter and prescription medicines only as told by your doctor.  Follow instructions from your doctor about how to take care of your cut from surgery.  Keep all follow-up visits as told by your doctor or physical therapist. This is important.  This information is not intended to replace advice given to you by your health care provider. Make sure you discuss any questions you have with your health care provider.  Document Released: 04/12/2012 Document Revised: 01/28/2020 Document Reviewed: 01/28/2020  [employer] Patient Education © 2020 [employer]

## 2023-06-08 NOTE — PROGRESS NOTES
4 Eyes Skin Assessment Completed by RICKY Phan and Alonso RN.    Head WDL  Ears WDL  Nose WDL  Mouth WDL  Neck WDL  Breast/Chest WDL  Shoulder Blades WDL  Spine Incision, MARK gauze in place. On q pump and hemovac in place.   (R) Arm/Elbow/Hand WDL  (L) Arm/Elbow/Hand WDL  Abdomen WDL  Groin WDL  Scrotum/Coccyx/Buttocks WDL  (R) Leg WDL  (L) Leg WDL  (R) Heel/Foot/Toe WDL  (L) Heel/Foot/Toe WDL          Devices In Places Pulse Ox, SCD's, and Nasal Cannula      Interventions In Place Gray Ear Foams, Pillows, and Pressure Redistribution Mattress    Possible Skin Injury No    Pictures Uploaded Into Epic N/A  Wound Consult Placed N/A  RN Wound Prevention Protocol Ordered No

## 2023-06-08 NOTE — PROGRESS NOTES
DC instruction reviewed w/ pt, verbalized understanding. Meds to bed delivered. No piv on arrival to [facility]. Armband removed.

## 2023-06-08 NOTE — CARE PLAN
The patient is Stable - Low risk of patient condition declining or worsening    Shift Goals  Clinical Goals: pain mangement  Patient Goals: pain management    Progress made toward(s) clinical / shift goals:    Problem: Pain - Standard  Goal: Alleviation of pain or a reduction in pain to the patient’s comfort goal  Outcome: Progressing     Problem: Knowledge Deficit - Standard  Goal: Patient and family/care givers will demonstrate understanding of plan of care, disease process/condition, diagnostic tests and medications  Outcome: Progressing       Patient is not progressing towards the following goals:

## 2023-06-08 NOTE — THERAPY
Physical Therapy   Initial Evaluation     Patient Name: Yang Sánchez  Age:  57 y.o., Sex:  male  Medical Record #: [id number]  Today's Date: 6/8/2023     Precautions  Precautions: Spinal / Back Precautions   Comments: no brace per MD    Assessment  Patient is 57 y.o. male seen for PT eval s/p L3-S1 lumbar laminectomy with Left L3-L4 microdiscectomy. Upon PT eval, pt was agreeable to PT. Reviewed log roll and spinal precautions prior to mobilization. Pt able to transfer supine<>sit EOB with HOB flat and no railing with supervision. HHA assist provided for sit<>stand and gait. Pt only c/o pain, which remained stable throughout session. Pt reports wife is home and able to assist as needed, no further need for acute PT services at this time. Recommend outpatient physical therapy services upon DC when medically cleared.    Plan  DC Equipment Recommendations: None  Discharge Recommendations: Recommend outpatient physical therapy services to address higher level deficits       Subjective/Objective       06/08/23 1006   Pain 0 - 10 Group   Therapist Pain Assessment Post Activity Pain Same as Prior to Activity   Prior Living Situation   Prior Services None   Housing / Facility [address] [facility]   Steps Into Home 1   Equipment Owned Single Point Cane   Lives with - Patient's Self Care Capacity Spouse   Comments spouse available to assist as needed   Prior Level of Functional Mobility   Bed Mobility Independent   Transfer Status Independent   Ambulation Independent   Ambulation Distance as needed   Assistive Devices Used Single Point Cane   Stairs Independent   History of Falls   History of Falls No   Cognition    Cognition / Consciousness WDL   Level of Consciousness Alert   Passive ROM Lower Body   Passive ROM Lower Body WDL   Active ROM Lower Body    Active ROM Lower Body  WDL   Strength Lower Body   Lower Body Strength  WDL   Sensation Lower Body   Lower Extremity Sensation   X   Comments c/o NT below knee, report it was  present prior to surgery   Coordination Lower Body    Coordination Lower Body  WDL   Balance Assessment   Sitting Balance (Static) Fair +   Sitting Balance (Dynamic) Fair +   Standing Balance (Static) Fair   Standing Balance (Dynamic) Fair   Weight Shift Sitting Good   Weight Shift Standing Fair   Comments R HHA   Bed Mobility    Supine to Sit Supervised   Sit to Supine Supervised   Scooting Supervised   Rolling Supervised   Comments log roll to L   Gait Analysis   Gait Level Of Assist Contact Guard Assist   Assistive Device Hand Held Assist   Distance (Feet) 150   Deviation Bradykinetic;Decreased Base Of Support;Antalgic   Level of Assist with Stairs Contact Guard Assist   Functional Mobility   Sit to Stand Supervised   Bed, Chair, Wheelchair Transfer Supervised   Transfer Method Stand Step   Comments HHA   Activity Tolerance   Sitting in Chair NT   Sitting Edge of Bed 5min   Standing 15min

## 2023-06-08 NOTE — PROGRESS NOTES
Neurosurgery Progress Note    Subjective:  POD #1 seen with Dr. Benoit  Pain controlled w/ orals  Mobilizing  Voiding  Eating and Drinking   No N/V   Still having leg pains/numbness: unchanged    Exam:  Wound: C/D/I  Labs stable  VSS  Drains: 20cc  LE motor 5/5 throughout bilaterally    BP  Min: 105/60  Max: 152/90  Pulse  Av.4  Min: 77  Max: 104  Resp  Avg: 15.7  Min: 11  Max: 20  Temp  Av.5 °C (97.7 °F)  Min: 36.1 °C (96.9 °F)  Max: 36.7 °C (98.1 °F)  SpO2  Av.5 %  Min: 95 %  Max: 99 %    No data recorded    Recent Labs     23  0346   WBC 9.1   RBC 4.04*   HEMOGLOBIN 12.8*   HEMATOCRIT 37.6*   MCV 93.1   MCH 31.7   MCHC 34.0   RDW 41.4   PLATELETCT 143*   MPV 10.3     Recent Labs     23  0346   SODIUM 140   POTASSIUM 4.4   CHLORIDE 107   CO2 24   GLUCOSE 138*   BUN 18   CREATININE 1.22   CALCIUM 8.2*               Intake/Output                         23 07 - 23 0659 23 07 - 23 0659      [id number] Total 3617-9498 9474-6714 Total                 Intake    I.V.  1082.8  -- 1082.8  --  -- --    Phenylephrine Volume 12.8 -- 12.8 -- -- --    Propofol Volume 20 -- 20 -- -- --    Volume (mL) (lactated ringers infusion) 1050 -- 1050 -- -- --    IV Piggyback  20  -- 20  --  -- --    Volume (mL) (Tranexamic Acid (CYKLOKAPRON) injection) 20 -- 20 -- -- --    Total Intake 1102.8 -- 1102.8 -- -- --       Output    Urine  --  -- --  --  -- --    Number of Times Voided -- 4 x 4 x -- -- --    Drains  --  40 40  --  -- --    Output (mL) (Closed/Suction Drain Midline;Posterior Back Hemovac) -- 40 40 -- -- --    Blood  100  -- 100  --  -- --    Est. Blood Loss 100 -- 100 -- -- --    Total Output 100 40 140 -- -- --       Net I/O     1002.8 -40 962.8 -- -- --              Intake/Output Summary (Last 24 hours) at 2023 0744  Last data filed at 2023 0430  Gross per 24 hour   Intake 1102.83 ml   Output 140 ml   Net 962.83 ml             gabapentin  800 mg TID     Pharmacy Consult Request  1 Each PHARMACY TO DOSE    MD OLAYINKA...DO NOT ADMINISTER NSAIDS or ASPIRIN unless ORDERED By Neurosurgery  1 Each PRN    docusate sodium  100 mg BID    senna-docusate  1 Tablet Nightly    senna-docusate  1 Tablet Q24HRS PRN    polyethylene glycol/lytes  1 Packet BID PRN    magnesium hydroxide  30 mL QDAY PRN    bisacodyl  10 mg Q24HRS PRN    sodium phosphate  1 Each Once PRN    0.9 % NaCl with KCl 20 mEq 1,000 mL   Continuous    acetaminophen  1,000 mg Q6HRS    Followed by    [START ON 6/12/2023] acetaminophen  1,000 mg Q6HRS PRN    oxyCODONE immediate-release  5 mg Q3HRS PRN    Or    oxyCODONE immediate-release  10 mg Q3HRS PRN    Or    HYDROmorphone  0.5 mg Q3HRS PRN    diphenhydrAMINE  25 mg Q6HRS PRN    Or    diphenhydrAMINE  25 mg Q6HRS PRN    ondansetron  4 mg Q4HRS PRN    ondansetron  4 mg Q4HRS PRN    promethazine  12.5-25 mg Q4HRS PRN    promethazine  12.5-25 mg Q4HRS PRN    prochlorperazine  5-10 mg Q4HRS PRN    baclofen  5 mg TID PRN    labetalol  10 mg Q HOUR PRN    benzocaine-menthol  1 Lozenge Q2HRS PRN    vitamin D3  5,000 Units DAILY    ropivacaine 0.2 % (NAROPIN) 2 mg/mL 270 mL in On-Q Pump infusion   Continuous       Assessment and Plan:  Hospital day #2  POD #1    Plan:  D/C hemovac and OnQ today at 1400hrs  Work with PT/OT today  Lovenox shot today at 1400hrs   D/C home today at 1400hrs  Meds to beds for scripts

## 2023-06-09 ASSESSMENT — PAIN SCALES - GENERAL: PAIN_LEVEL: 8

## 2023-06-09 NOTE — ANESTHESIA POSTPROCEDURE EVALUATION
Patient: Yang Sánchez    Procedure Summary     Date: 06/07/23 Room / Location: [place] [address] / SURGERY [facility]    Anesthesia Start: 1343 Anesthesia Stop: 1533    Procedure: L3-4, L4-5, L5-S1 decompressive laminectomy bilateral foraminotomies with a left L3-4 discectomy (Spine Lumbar) Diagnosis:       Neurogenic claudication due to lumbar spinal stenosis      (Neurogenic claudication due to lumbar spinal stenosis )    Surgeons: Manasa Benoit M.D. Responsible Provider: Artemio Dos Santos M.D.    Anesthesia Type: general ASA Status: 3          Final Anesthesia Type: general  Last vitals  BP   Blood Pressure: (!) 143/94 (Nurse notified )    Temp   36.8 °C (98.2 °F)    Pulse   98   Resp   18    SpO2   92 %      Anesthesia Post Evaluation    Patient location during evaluation: PACU  Patient participation: complete - patient participated  Level of consciousness: awake and alert  Pain score: 8    Airway patency: patent  Anesthetic complications: no  Cardiovascular status: hemodynamically stable  Respiratory status: acceptable  Hydration status: euvolemic    PONV: none          No notable events documented.     Nurse Pain Score: 8 (NPRS)         Detail Level: Simple Detail Level: Generalized Detail Level: Detailed

## (undated) DEVICE — DRAPE 36X28IN RAD CARM BND BG - (25/CA) O

## (undated) DEVICE — SUTURE GENERAL

## (undated) DEVICE — GLOVE BIOGEL INDICATOR SZ 8 SURGICAL PF LTX - (50/BX 4BX/CA)

## (undated) DEVICE — KIT SURGIFLO W/OUT THROMBIN - (6EA/CA)

## (undated) DEVICE — TUBING CLEARLINK DUO-VENT - C-FLO (48EA/CA)

## (undated) DEVICE — DERMABOND ADVANCED - (12EA/BX)

## (undated) DEVICE — HEADREST PRONEVIEW LARGE - (10/CA)

## (undated) DEVICE — TUBING C&T SET FLYING LEADS DRAIN TUBING (10EA/BX)

## (undated) DEVICE — GLOVE BIOGEL SZ 8 SURGICAL PF LTX - (50PR/BX 4BX/CA)

## (undated) DEVICE — SODIUM CHL IRRIGATION 0.9% 1000ML (12EA/CA)

## (undated) DEVICE — SUTURE 4-0 30CM STRATAFIX SPIRAL PS-2 (12EA/BX)

## (undated) DEVICE — DEVICE MONOPOLAR RF PEAK PLASMABLADE 3.0S

## (undated) DEVICE — GOWN WARMING STANDARD FLEX - (30/CA)

## (undated) DEVICE — CHLORAPREP 26 ML APPLICATOR - ORANGE TINT(25/CA)

## (undated) DEVICE — LACTATED RINGERS INJ. 500 ML - (24EA/CA)

## (undated) DEVICE — PACK NEURO - (2EA/CA)

## (undated) DEVICE — TOOL MR8 14CM MATCH HD SYM-TRI 3MM DIAMETER (1/EA)

## (undated) DEVICE — SUTURE 2-0 VICRYL PLUS CT-1 - 8 X 18 INCH(12/BX)

## (undated) DEVICE — ARMREST CRADLE FOAM - (2PR/PK 12PR/CA)

## (undated) DEVICE — SET LEADWIRE 5 LEAD BEDSIDE DISPOSABLE ECG (1SET OF 5/EA)

## (undated) DEVICE — COVER LIGHT HANDLE ALC PLUS DISP (18EA/BX)

## (undated) DEVICE — MIDAS LUBRICATOR DIFFUSER PACK (4EA/CA)

## (undated) DEVICE — DRAPE SURG STERI-DRAPE 7X11OD - (40EA/CA)

## (undated) DEVICE — SET EXTENSION WITH 2 PORTS (48EA/CA) ***PART #2C8610 IS A SUBSTITUTE*****

## (undated) DEVICE — SUCTION TUBE FRAZIER 12FR STERILE (40EA/CA)

## (undated) DEVICE — CANISTER SUCTION 3000ML MECHANICAL FILTER AUTO SHUTOFF MEDI-VAC NONSTERILE LF DISP  (40EA/CA)

## (undated) DEVICE — SUTURE 1 VICRYL PLUS CTX - 8 X 18 INCH (12/BX)

## (undated) DEVICE — BLADE SURGICAL CLIPPER - (50EA/CA)

## (undated) DEVICE — COVER MAYO STAND X-LG - (22EA/CA)

## (undated) DEVICE — DRAPE LAPAROTOMY T SHEET - (12EA/CA)

## (undated) DEVICE — SLEEVE, VASO, THIGH, MED

## (undated) DEVICE — ELECTRODE DUAL RETURN W/ CORD - (50/PK)

## (undated) DEVICE — LACTATED RINGERS INJ 1000 ML - (14EA/CA 60CA/PF)

## (undated) DEVICE — SENSOR OXIMETER ADULT SPO2 RD SET (20EA/BX)

## (undated) DEVICE — PUMP ON-Q 270 DUAL 2ML FIXED RATE (5EA/CA)

## (undated) DEVICE — KIT EVACUATER 3 SPRING PVC LF 1/8 DRAIN SIZE (10EA/CA)"

## (undated) DEVICE — SUCTION INSTRUMENT YANKAUER BULBOUS TIP W/O VENT (50EA/CA)

## (undated) DEVICE — TOWEL STOP TIMEOUT SAFETY FLAG (40EA/CA)

## (undated) DEVICE — SEALER BIPOLAR 2.3 AQUAMANTYS

## (undated) DEVICE — SPONGE GAUZESTER 4 X 4 4PLY - (128PK/CA)